# Patient Record
Sex: MALE | Race: WHITE | URBAN - METROPOLITAN AREA
[De-identification: names, ages, dates, MRNs, and addresses within clinical notes are randomized per-mention and may not be internally consistent; named-entity substitution may affect disease eponyms.]

---

## 2018-03-02 ENCOUNTER — ALLIED HEALTH/NURSE VISIT (OUTPATIENT)
Dept: SURGERY | Facility: CLINIC | Age: 68
End: 2018-03-02
Payer: COMMERCIAL

## 2018-03-02 ENCOUNTER — ANESTHESIA EVENT (OUTPATIENT)
Dept: SURGERY | Facility: CLINIC | Age: 68
End: 2018-03-02
Payer: MEDICARE

## 2018-03-02 ENCOUNTER — OFFICE VISIT (OUTPATIENT)
Dept: SURGERY | Facility: CLINIC | Age: 68
End: 2018-03-02
Payer: COMMERCIAL

## 2018-03-02 ENCOUNTER — OFFICE VISIT (OUTPATIENT)
Dept: NEUROSURGERY | Facility: CLINIC | Age: 68
End: 2018-03-02
Payer: COMMERCIAL

## 2018-03-02 VITALS
HEIGHT: 69 IN | RESPIRATION RATE: 16 BRPM | SYSTOLIC BLOOD PRESSURE: 114 MMHG | BODY MASS INDEX: 33.1 KG/M2 | DIASTOLIC BLOOD PRESSURE: 66 MMHG | HEART RATE: 94 BPM | WEIGHT: 223.5 LBS | TEMPERATURE: 97.5 F | OXYGEN SATURATION: 95 %

## 2018-03-02 VITALS
HEIGHT: 69 IN | SYSTOLIC BLOOD PRESSURE: 114 MMHG | WEIGHT: 223.8 LBS | DIASTOLIC BLOOD PRESSURE: 66 MMHG | BODY MASS INDEX: 33.15 KG/M2 | HEART RATE: 109 BPM

## 2018-03-02 DIAGNOSIS — Z98.890 S/P CRANIOTOMY: ICD-10-CM

## 2018-03-02 DIAGNOSIS — Z01.818 PREOP EXAMINATION: ICD-10-CM

## 2018-03-02 DIAGNOSIS — G50.0 TRIGEMINAL NEURALGIA OF RIGHT SIDE OF FACE: ICD-10-CM

## 2018-03-02 DIAGNOSIS — Z01.818 PREOP EXAMINATION: Primary | ICD-10-CM

## 2018-03-02 DIAGNOSIS — G50.0 TRIGEMINAL NEURALGIA: Primary | ICD-10-CM

## 2018-03-02 LAB
ANION GAP SERPL CALCULATED.3IONS-SCNC: 6 MMOL/L (ref 3–14)
BUN SERPL-MCNC: 35 MG/DL (ref 7–30)
CALCIUM SERPL-MCNC: 9.6 MG/DL (ref 8.5–10.1)
CHLORIDE SERPL-SCNC: 106 MMOL/L (ref 94–109)
CO2 SERPL-SCNC: 31 MMOL/L (ref 20–32)
CREAT SERPL-MCNC: 1.47 MG/DL (ref 0.66–1.25)
ERYTHROCYTE [DISTWIDTH] IN BLOOD BY AUTOMATED COUNT: 13.8 % (ref 10–15)
GFR SERPL CREATININE-BSD FRML MDRD: 47 ML/MIN/1.7M2
GLUCOSE SERPL-MCNC: 93 MG/DL (ref 70–99)
HCT VFR BLD AUTO: 45.4 % (ref 40–53)
HGB BLD-MCNC: 14.7 G/DL (ref 13.3–17.7)
MCH RBC QN AUTO: 30.6 PG (ref 26.5–33)
MCHC RBC AUTO-ENTMCNC: 32.4 G/DL (ref 31.5–36.5)
MCV RBC AUTO: 94 FL (ref 78–100)
PLATELET # BLD AUTO: 190 10E9/L (ref 150–450)
POTASSIUM SERPL-SCNC: 3.8 MMOL/L (ref 3.4–5.3)
RBC # BLD AUTO: 4.81 10E12/L (ref 4.4–5.9)
SODIUM SERPL-SCNC: 143 MMOL/L (ref 133–144)
WBC # BLD AUTO: 9.4 10E9/L (ref 4–11)

## 2018-03-02 ASSESSMENT — PAIN SCALES - GENERAL: PAINLEVEL: NO PAIN (0)

## 2018-03-02 ASSESSMENT — LIFESTYLE VARIABLES: TOBACCO_USE: 1

## 2018-03-02 NOTE — ANESTHESIA PREPROCEDURE EVALUATION
Anesthesia Evaluation     . Pt has had prior anesthetic. Type: General and MAC    No history of anesthetic complications          ROS/MED HX    ENT/Pulmonary:     (+)allergic rhinitis, tobacco use, Past use 0.5 packs/day  , . .    Neurologic:     (+)other neuro left hemiplegia secondary to transverse myelitis in 2005.  right trigeminal neuralgia.      Cardiovascular:     (+) Dyslipidemia, hypertension----. : . . . :. . Previous cardiac testing date:results:date: results:ECG reviewed date:2016 results:SR, left axis deviation date: results:          METS/Exercise Tolerance:  1 - Eating, dressing   Hematologic:  - neg hematologic  ROS       Musculoskeletal:   (+) , , other musculoskeletal- left hemiplegia.  low back pain.        GI/Hepatic:  - neg GI/hepatic ROS       Renal/Genitourinary:     (+) chronic renal disease, type: CRI, Nephrolithiasis , Pt does not require dialysis, Pt has no history of transplant,       Endo:     (+) Obesity, .      Psychiatric:  - neg psychiatric ROS       Infectious Disease:  - neg infectious disease ROS       Malignancy:      - no malignancy   Other:    (+) H/O Chronic Pain,H/O chronic opiod use ,                    Physical Exam  Normal systems: pulmonary    Airway   Mallampati: III  TM distance: >3 FB  Neck ROM: full    Dental   (+) missing  Comment: Multiple missing teeth    Cardiovascular   Rhythm and rate: regular and normal  (+) weak pulses       Pulmonary    breath sounds clear to auscultation    Other findings: Left hemiplegia  BLE edema L>R           PAC Discussion and Assessment    ASA Classification: 3  Case is suitable for: [place]  Anesthetic techniques and relevant risks discussed: GA  Invasive monitoring and risk discussed:   Types:   Possibility and Risk of blood transfusion discussed:   NPO instructions given:   Additional anesthetic preparation and risks discussed:   Needs early admission to pre-op area:   Other:     PAC Resident/NP Anesthesia Assessment:  Colby  Iván is a 67 year old male scheduled for a Radio Frequency Right Rhizotomy on 3/15/2018 by Dr. Conrad in treatment of trigeminal neuralgia.  PAC referral for risk assessment and optimization for anesthesia with comorbid conditions of: hypertension, hyperlipidemia, allergic rhinitis, history of smoking, obesity and left hemiplegia secondary to history of transverse myelitis.    Pre-operative considerations:  1.  Cardiac:  Functional status- METS 1-2.  He is mostly sedentary in a wheelchair, but is able to walk around his home with a walker.  He reports though that if he were to walk outside for more than 100ft he would likely get short of breath.  Exertional dyspnea likely due to his sedentary lifestyle and deconditioning. Hypertension is managed with lisinopril/hctz and amlodipine; hold lisinopril/hctz DOS.  He has significant non-pitting BLE edema, but the left is much greater than the right.  He wears compression hose and wraps.  Intermediate risk surgery with 0.4% risk of major adverse cardiac event.   2.  Pulm:  Airway feasible.  NIKKIE risk: intermediate.  Quit smoking in 2013.     3.  GI:  Risk of PONV score = 2.  If > 2, anti-emetic intervention recommended.  4. Neuromuscular:  Left hemiplegia secondary to transverse myelitis in 2005.  He reports that the transverse myelitis was caused by an infection at the time.  He uses a wheelchair, but is able to walk short distances with a walker by dragging his left leg.  He also has periodic low back pain.  Consider fall risk precautions and cautious positioning.  He denies any history of gout, but reports that he takes allopurinol due to a history of elevated uric acid.  He uses the norco only very rarely.    5. Heme:  Instructed by neurosurgery to hold his ibuprofen and aspirin for 5 days prior to surgery.  6. Renal:  CKD - creatinine today 1.47    VTE risk: 1.8%    Patient is optimized and is acceptable candidate for the proposed procedure.  No further diagnostic  evaluation is needed.     Patient discussed with Dr. Pham.     For further details of assessment, testing, and physical exam please see H and P completed on same date.          Valentine Arroyo DNP, RN, APRN      Reviewed and Signed by PAC Mid-Level Provider/Resident  Mid-Level Provider/Resident: Valentine Arroyo DNP, RN, APRN  Date: 3/2/2018  Time: 1525    Attending Anesthesiologist Anesthesia Assessment:  67 year old for RF right rhizotomy in treatment of trigeminal neuralgia. Patient is debilitated due to transverse myelitis in the past; this is low risk surgery, so no further workup indicated.    Patient/case discussed with VIVIEN. No need to see patient. Patient is appropriate for the planned procedure without further work-up or medical management.      Reviewed and Signed by PAC Anesthesiologist  Anesthesiologist: barrera  Date: 3/2/2018  Time:   Pass/Fail: Pass  Disposition:     PAC Pharmacist Assessment:        Pharmacist:   Date:   Time:      Anesthesia Plan      History & Physical Review  History and physical reviewed and following examination; no interval change.    ASA Status:  3 .        Plan for MAC and General with Intravenous induction. Maintenance will be Balanced.  Reason for MAC:  Procedure to face, neck, head or breast         Postoperative Care      Consents                          .

## 2018-03-02 NOTE — PATIENT INSTRUCTIONS
Preparing for Your Surgery      Name:  Colby Minor   MRN:  [id number]   :  1950   Today's Date:  3/2/2018     Arriving for surgery:  Surgery date:  3/15/18  Arrival time:  6 am    Please come to:   [facility] [address]  [address] [place]  [address]    -   parking is available in front of the hospital from 5:15 am to 8:00 pm    -  Stop at the Information Desk in the lobby    -   Inform the information person that you are here for surgery. An escort to 3C will be provided. If you would not like an escort, please proceed to 3C on the 3rd floor. [phone]   -  Bring your ID and insurance card.    What can I eat or drink?  -  You may have solid food or milk products until 8 hours prior to your surgery. (12 midnight )  -  You may have water, apple juice, BLACK coffee (NO creamer or nondairy creamer), or 7up/Sprite until 2 hours prior to your surgery. (6 am )    Which medicines can I take?       -  Do not bring your own medications to the hospital.          -  Hold Multivitamin and Vitamin D3 for 7 days prior to surgery.        -  Hold Aspirin and Ibuprofen 5 days prior to surgery. Last dose 18. (Per Grecia Mcrae NP 3-2-18 instructions)    -  Do NOT take these medications in the morning, the day of surgery:  Lisinopril-Hydrochlorothiazide, Miralax    -  Please take these medications the morning of surgery:  Dilantin, Claritin, Allopurinol, Baclofen, Atorvastatin (If day to take it)      Hydrocodone if needed    How do I prepare myself?  -  Take two showers: one the night before surgery; and one the morning of surgery.         Use Scrubcare or Hibiclens to wash from neck down.  You may use your own shampoo and conditioner. No other hair products.   -  Do NOT use lotion, powder, colognes, deodorant, or antiperspirant the day of your surgery.  -  Do NOT wear any jewelry.    Questions or Concerns:  If you have questions or concerns, please call  the  Preoperative Assessment Center, Monday-Friday 7AM-7PM:  [phone]    AFTER YOUR SURGERY  Breathing exercises   Breathing exercises help you recover faster. Take deep breaths and let the air out slowly. This will:     Help you wake up after surgery.    Help prevent complications like pneumonia.  Preventing complications will help you go home sooner.   Nausea and vomiting   You may feel sick to your stomach after surgery; if so, let your nurse know.    Pain control:  After surgery, you may have pain. Our goal is to help you manage your pain. Pain medicine will help you feel comfortable enough to do activities that will help you heal.  These activities may include breathing exercises, walking and physical therapy.   To help your health care team treat your pain we will ask: 1) If you have pain  2) where it is located 3) describe your pain in your words  Methods of pain control include medications given by mouth, vein or by nerve block for some surgeries.  Sequential Compression Device (SCD) or Pneumo Boots:  You may need to wear SCD S on your legs or feet. These are wraps connected to a machine that pumps in air and releases it. The repeated pumping helps prevent blood clots from forming.

## 2018-03-02 NOTE — LETTER
3/2/2018       RE: Colby Minor  [address] [place] [id number]     Dear Colleague,    Thank you for referring your patient, Colby Minor, to the [facility] NEUROSURGERY at [facility]. Please see a copy of my visit note below.    Neurosurgery Consultation    Colby Minor MRN# [id number]   YOB: 1950 Age: 67 year old   Date of Service: 03/03/18     Provider Requesting Consultation:   Colby Lindsey MD  [place] [phone]    Dear Dr. Lindsey,    I had the pleasure of seeing Mr. Minor in the office at your request in consultation regarding his recurrent right sided facial pain.    This is a 67 year old male with a PMH significant for myelitis, spinal cord injury with resulting left sided weakness, HTN, hyperlipidemia, chronic left shoulder pain, venous insufficieny in LLE, gout, seasonal allergy, and right-sided trigeminal neuralgia. He is known to Dr. Conrad of our service for treating his trigeminal neuralgia with radiofrequency rhizotomy (RF) on 7/21/015 and microvascular decompression (MVD) on 11/3/2016.   The patient presents with 3 months history of recurrent paroxysmal lancinating pain from the right periauricular area radiating into the right cheek, right mandible and occasionally in his right forehead. He denies a precipitating factor. He further denies a history of TMD/ oral/ dental disease, bruxism, shingles, lyme's disease, and head/face injuries or traumas following the MVD. The pain attacks him sporadically or when he eats, talks, swallows and moves his face. He endorses mild numbness in the inner mucosal since the RF. He otherwise denies constant background aching pain, dysesthesia, associated autonomic symptoms, or pain in the contralateral side of his face.  The symptom escalated significantly since 1/2018 for which his PCP started him on Dilantin 30 mg TID with no relief. He also had a head CT in  1/2018, which was negative for pathology per patient. The patient states that he does not wish to stay on medication for long term management given his previous complications of hyponatremia and Dilantin toxicity as well as side effects of cognitive impairment and increased weakness of left leg from baseline. Most importantly, he lives alone and can only walk for a short distance with a walker and hence safety is a big concern. He is interested in surgical intervention for his recurrent trigeminal neuralgia pain.            Past Medical History:     Past Medical History:   Diagnosis Date     Allergic rhinitis      CKD (chronic kidney disease)      Gout      Hyperlipidemia      Hypertension      Left hemiplegia (H)      Nephrolithiasis      Transverse myelitis (H) 2005     Trigeminal neuralgia      Venous (peripheral) insufficiency     LLE              Past Surgical History:     Past Surgical History:   Procedure Laterality Date     COLONOSCOPY       CRANIOTOMY, DECOMPRESS NEUROVASCULAR, COMBINED Right 11/3/2016    Procedure: COMBINED CRANIOTOMY, DECOMPRESS NEUROVASCULAR;  Surgeon: Richard Conrad MD;  Location: [facility] OR     ENT SURGERY      tonsillectomy     GI SURGERY      hemorrhoidectomy     IR RHIZOTOMY  2015     right hip replacement                Social History:     Social History     Social History     Marital status:      Spouse name: N/A     Number of children: 4     Years of education: N/A     Occupational History     retired      Social History Main Topics     Smoking status: Former Smoker     Packs/day: 0.50     Years: 10.00     Types: Cigarettes     Start date: 1/1/1993     Quit date: 10/11/2013     Smokeless tobacco: Never Used     Alcohol use No     Drug use: No     Sexual activity: Not on file     Other Topics Concern     Not on file     Social History Narrative             Family History:     Family History   Problem Relation Age of Onset     Lung Cancer Mother      CEREBROVASCULAR  DISEASE Father      Unknown/Adopted Sister              Immunizations:   There is no immunization history for the selected administration types on file for this patient.          Allergies:     No Active Allergies           Medications:     Current Outpatient Prescriptions:      phenytoin (DILANTIN) 30 MG CR capsule, Take 2 capsules (60 mg) by mouth 3 times daily, Disp: 90 capsule, Rfl: 0     aspirin 325 MG tablet, Take 1 tablet (325 mg) by mouth every morning, Disp: 120 tablet, Rfl: 0     ibuprofen 200 MG capsule, Take 200 mg by mouth 3 times daily, Disp: 120 capsule, Rfl: 0     Incontinence Supplies (LEG ) MISC, 1 Units continuous prn, Disp: 1 each, Rfl: 0     AMLODIPINE BESYLATE PO, Take by mouth every evening , Disp: , Rfl:      POTASSIUM CITRATE PO, Take by mouth daily (with dinner) , Disp: , Rfl:      lidocaine (LMX4) 4 % CREA 4% topical cream, Apply topically once as needed for mild pain, Disp: , Rfl:      lisinopril-hydrochlorothiazide (PRINZIDE,ZESTORETIC) 20-25 MG per tablet, Take 1 tablet by mouth every morning , Disp: , Rfl:      loratadine (CLARITIN) 10 MG tablet, Take 10 mg by mouth every morning , Disp: , Rfl:      Multiple Vitamins-Minerals (MULTIVITAMIN PO), Take 1 tablet by mouth every morning , Disp: , Rfl:      ALLOPURINOL PO, Take 300 mg by mouth every morning , Disp: , Rfl:      ATORVASTATIN CALCIUM PO, Take 10 mg by mouth every other day At  Bedtime, Disp: , Rfl:      Cholecalciferol (VITAMIN D3 PO), Take 2,000 Units by mouth every morning , Disp: , Rfl:      BACLOFEN PO, Take 40 mg by mouth 3 times daily , Disp: , Rfl:      HYDROCODONE-ACETAMINOPHEN PO, Take by mouth as needed, Disp: , Rfl:      Polyethylene Glycol 3350 (MIRALAX PO), Take by mouth every morning, Disp: , Rfl:      Pseudoephedrine HCl (NASAL DECONGESTANT PO), Take by mouth as needed, Disp: , Rfl:             Review of Systems:     ROS: His 10 point review of system is as stated above in HPI, PMH, and PSH. The  "remaining system is otherwise negative.               Physical Exam:   /66 (BP Location: Left arm, Patient Position: Sitting, Cuff Size: Adult Large)  Pulse 109  Ht 1.75 m (5' 8.9\")  Wt 101.5 kg (223 lb 12.8 oz)  BMI 33.15 kg/m2    General: Well-nourished and well-developed in no acute distress.  HEENT: Head is normocephalic and atraumatic. Neck is supple without adenopathy or thyromegaly. Palpation of his bilateral temporomandibular joint shows no popping, clicking, tenderness and/or swelling. Conjunctivae are anicteric. Oropharynx and nasopharynx are without exudate and/or erythema. Inspection of his oral cavity shows no obvious ulcerations and/or lesions.   Lungs: Clear to auscultation.   Cardiovascular: Heart rate is regular with S1, S2. Peripheral pulses are +2 throughout. He has no JVD. Positive edema in LLE with compression stocking on         Focused Neurologic Exam:   He is alert, oriented and cooperative. He answers questions and follows commands appropriately in fluent speech and normal tone. Memory and fund of knowledge are normal. Bilateral pupils are round, equal and reactive to light and accommodation. Extraocular movements are intact with no nystagmus and/or disconjugation. Visual fields to direct confrontation are full. Corneal reflexes are positive bilaterally. Masseter and temporalis muscle strength is normal. Facial sensation and expression are symmetric. Hearing is to  baseline bilaterally. Gag reflex is present. Tongue and uvula are midline with normal palate movements and no fasciculation. Trapezius and sternocleidal strength is equivocal. He has no finger-to-nose dysmetria. Finger-nose-finger, heel-to-shin rub and rapid alternating finger and hand taps are normal.   Strength in upper and RLE extremities is 5/5 and 4/5 in LLE. Deep tendon reflexes are symmetric. Sensation to light touch is intact in 4 extremities. He has difficulty to arise from the chair and ambulate due to edema " and weakness in the LLE without his walker. Romberg test and Tandem walk are deferred.          Data:   No images and labs for review.           Assessment and Recommendation:   Mr. Minor presents with 3 months history of recurrent idiopathic right-sided trigeminal neuralgia in V2 and V3 distributions.     Base on his clinical history, life style, and living situation, I told the patient that it is reasonable to consider surgery. I would recommend repeat radiofrequency rhizotomy or balloon compression in his case.    The patient was informed that radiofrequency rhizotomy offers a slightly better outcome (average 4-5 years) compared to the balloon compression and glycerol rhizolysis (average 1.5-2 years). The initial success rate of radiofrequency is about 80% procedure ranges between 60%-80% with a medium term recurrence rate of 2-3 years.  The procedure can be repeated for future recurrences upon reevaluation.  Potential complications associated with percutaneous procedures include but are not limited to dysesthesia (rarely anesthesia dolorosa), bleeding, meningitis, alternation in salivation, temporary ocular paresis, partial masseter weakness, corneal anesthesia, neuroparalytic keratitis, alternation in lacrimation and possibly failure to respond.    He also asked a number of good questions, which I answered to the best of my knowledge today. He said he is already familiar with the radiofrequency rhizotomy and would like to move forward with this. I will have our office schedule the surgery and preop H&P in PAC accordingly. Due to the distance, the patient is okay to final review the procedure with Dr. Conrad over the phone or on the day of procedure.     Lastly, I suggest increasing the Dilantin dosage to 60 mg TID to bridge the pain between now and the surgery. He is agreeable to this.    Thank you very much for allowing us to participate in the care of this patient. Please do not hesitate to contact us with  questions. We will keep you informed of his progress.     > 70% of the 60 minutes visit today I spent counseling and educating the patient on the surgical and non-surgical options for recurrent trigeminal neuralgia.      Again, thank you for allowing me to participate in the care of your patient.      Sincerely,    NIHARIKA Turner CNP

## 2018-03-02 NOTE — H&P
Pre-Operative H & P     CC:  Preoperative exam to assess for increased cardiopulmonary risk while undergoing surgery and anesthesia.    Date of Encounter: 3/2/2018  Primary Care Physician:  Colby Lindsey  Colby Minor is a 67 year old male who presents for pre-operative H & P in preparation for a Radio Frequency Right Rhizotomy with Dr. Conrad on 3/15/2018 at [facility].     Colby Minor is a 67 year old male with hypertension, hyperlipidemia, allergic rhinitis, history of smoking, obesity and left hemiplegia secondary to history of transverse myelitis that has chronic right sided trigeminal neuralgia.  He had a rhizotomy procedure in 2015, but that only seemed to help the symptoms for about 2 months.  Then in 2016 a decompression procedure was done to try to treat the trigeminal neuralgia since the rhizotomy didn't lead to long term relief.  The symptoms only improved after that for about 3 months.  Then the trigeminal neuralgia pain seemed to worsen and become much more frequent to an almost constant state around the end of November 2017.  He has been on dilantin for management, but that hasn't helped much.  He has norco for prn use, but he says he rarely takes that as it doesn't seem to help much either.  He followed up in the neurosurgery clinic today due to the severity of his trigeminal neuralgia.  The dilantin was increased and another rhizotomy procedure was recommended.      History is obtained from the patient.     Past Medical History  Past Medical History:   Diagnosis Date     Allergic rhinitis      Gout      Hyperlipidemia      Hypertension      Left hemiplegia (H)      Nephrolithiasis      Transverse myelitis (H) 2005     Trigeminal neuralgia        Past Surgical History  Past Surgical History:   Procedure Laterality Date     COLONOSCOPY       CRANIOTOMY, DECOMPRESS NEUROVASCULAR, COMBINED Right 11/3/2016    Procedure: COMBINED CRANIOTOMY,  DECOMPRESS NEUROVASCULAR;  Surgeon: Richard Conrad MD;  Location: [facility] OR     ENT SURGERY      tonsillectomy     GI SURGERY      hemorrhoidectomy     IR RHIZOTOMY  2015     right hip replacement         Hx of Blood transfusions/reactions: none     Hx of abnormal bleeding or anti-platelet use: aspirin      Steroid use in the last year: none    Personal or FH with difficulty with Anesthesia:  none    Prior to Admission Medications  Current Outpatient Prescriptions   Medication Sig Dispense Refill     phenytoin (DILANTIN) 30 MG CR capsule Take 2 capsules (60 mg) by mouth 3 times daily 90 capsule 0     HYDROCODONE-ACETAMINOPHEN PO Take by mouth as needed       Polyethylene Glycol 3350 (MIRALAX PO) Take by mouth every morning       Pseudoephedrine HCl (NASAL DECONGESTANT PO) Take by mouth as needed       aspirin 325 MG tablet Take 1 tablet (325 mg) by mouth every morning 120 tablet 0     ibuprofen 200 MG capsule Take 200 mg by mouth 3 times daily 120 capsule 0     AMLODIPINE BESYLATE PO Take by mouth every evening        POTASSIUM CITRATE PO Take by mouth daily (with dinner)        lidocaine (LMX4) 4 % CREA 4% topical cream Apply topically once as needed for mild pain       lisinopril-hydrochlorothiazide (PRINZIDE,ZESTORETIC) 20-25 MG per tablet Take 1 tablet by mouth every morning        loratadine (CLARITIN) 10 MG tablet Take 10 mg by mouth every morning        Multiple Vitamins-Minerals (MULTIVITAMIN PO) Take 1 tablet by mouth every morning        ALLOPURINOL PO Take 300 mg by mouth every morning        ATORVASTATIN CALCIUM PO Take 10 mg by mouth every other day        Cholecalciferol (VITAMIN D3 PO) Take 2,000 Units by mouth every morning        BACLOFEN PO Take 40 mg by mouth 3 times daily        Incontinence Supplies (LEG ) MISC 1 Units continuous prn 1 each 0       Allergies  Allergies   Allergen Reactions     Seasonal Allergies      Other reaction(s): Runny Nose       Social History  Social  "History     Social History     Marital status:      Spouse name: N/A     Number of children: 4     Years of education: N/A     Occupational History     retired      Social History Main Topics     Smoking status: Former Smoker     Packs/day: 0.50     Years: 10.00     Types: Cigarettes     Start date: 1/1/1993     Quit date: 10/11/2013     Smokeless tobacco: Never Used     Alcohol use No     Drug use: No     Sexual activity: Not on file     Other Topics Concern     Not on file     Social History Narrative       Family History  Family History   Problem Relation Age of Onset     Lung Cancer Mother      CEREBROVASCULAR DISEASE Father      Unknown/Adopted Sister            ROS/MED HX  The complete review of systems is negative other than noted in the HPI or here.   ENT/Pulmonary:     (+)allergic rhinitis, tobacco use, Past use 0.5 packs/day  , . .    Neurologic:     (+)other neuro left hemiplegia secondary to transverse myelitis in 2005.  right trigeminal neuralgia.      Cardiovascular:     (+) Dyslipidemia, hypertension----. : . . . :. . Previous cardiac testing date:results:date: results:ECG reviewed date:2016 results:SR, left axis deviation date: results:          METS/Exercise Tolerance:  1 - Eating, dressing   Hematologic:  - neg hematologic  ROS       Musculoskeletal:   (+) , , other musculoskeletal- left hemiplegia.  low back pain.        GI/Hepatic:  - neg GI/hepatic ROS       Renal/Genitourinary:     (+) Nephrolithiasis ,  CKD     Endo:     (+) Obesity, .      Psychiatric:  - neg psychiatric ROS       Infectious Disease:  - neg infectious disease ROS       Malignancy:      - no malignancy   Other:    (+) H/O Chronic Pain,H/O chronic opiod use ,            Temp: 97.5  F (36.4  C) Temp src: Oral BP: 114/66 Pulse: 94   Resp: 16 SpO2: 95 %         223 lbs 8 oz  5' 8.9\"   Body mass index is 33.1 kg/(m^2).       Physical Exam  Constitutional: Awake, alert, cooperative, no apparent distress, and appears older " than stated age. obese  Eyes: Pupils equal, round and reactive to light, extra ocular muscles intact, sclera clear, conjunctiva normal.  HENT: Normocephalic, oral pharynx with moist mucus membranes.  Dentition - multiple missing teeth.  No goiter appreciated.   Respiratory: Clear to auscultation bilaterally, no crackles or wheezing.  Cardiovascular: Regular rate and rhythm, normal S1 and S2, and no murmur noted.  Carotids +2, no bruits. BLE non-pitting edema; L>R. Palpable radial pulses.  Bilateral pedal pulses diminished.    GI: Normal bowel sounds, soft, non-distended, non-tender, no masses palpated, no hepatosplenomegaly.  Large ventral hernia.    Lymph/Hematologic: No cervical lymphadenopathy and no supraclavicular lymphadenopathy.  Genitourinary:  deferred  Skin: Warm and dry.  No rashes at anticipated surgical site.   Musculoskeletal: Limited ROM of neck. There is no redness, warmth, or swelling of the exposed joints. Gross motor strength is normal on the right, but diminished on the left.   Neurologic: Awake, alert, oriented to name, place and time. Cranial nerves II-XII are grossly intact. Gait is impaired.    Neuropsychiatric: Calm, cooperative. Normal affect.     Labs: (personally reviewed)   Component      Latest Ref Rng & Units 3/2/2018   Sodium      133 - 144 mmol/L 143   Potassium      3.4 - 5.3 mmol/L 3.8   Chloride      94 - 109 mmol/L 106   Carbon Dioxide      20 - 32 mmol/L 31   Anion Gap      3 - 14 mmol/L 6   Glucose      70 - 99 mg/dL 93   Urea Nitrogen      7 - 30 mg/dL 35 (H)   Creatinine      0.66 - 1.25 mg/dL 1.47 (H)   GFR Estimate      >60 mL/min/1.7m2 47 (L)   GFR Estimate If Black      >60 mL/min/1.7m2 57 (L)   Calcium      8.5 - 10.1 mg/dL 9.6   WBC      4.0 - 11.0 10e9/L 9.4   RBC Count      4.4 - 5.9 10e12/L 4.81   Hemoglobin      13.3 - 17.7 g/dL 14.7   Hematocrit      40.0 - 53.0 % 45.4   MCV      78 - 100 fl 94   MCH      26.5 - 33.0 pg 30.6   MCHC      31.5 - 36.5 g/dL 32.4    RDW      10.0 - 15.0 % 13.8   Platelet Count      150 - 450 10e9/L 190       EK  Sinus rhythm, left axis deviation      ASSESSMENT and PLAN  Colby Minor is a 67 year old male scheduled for a Radio Frequency Right Rhizotomy on 3/15/2018 by Dr. Conrad in treatment of trigeminal neuralgia.  PAC referral for risk assessment and optimization for anesthesia with comorbid conditions of: hypertension, hyperlipidemia, allergic rhinitis, history of smoking, obesity and left hemiplegia secondary to history of transverse myelitis.    Pre-operative considerations:  1.  Cardiac:  Functional status- METS 1-2.  He is mostly sedentary in a wheelchair, but is able to walk around his home with a walker.  He reports though that if he were to walk outside for more than 100ft he would likely get short of breath.  Exertional dyspnea likely due to his sedentary lifestyle and deconditioning. Hypertension is managed with lisinopril/hctz and amlodipine; hold lisinopril/hctz DOS.  He has significant non-pitting BLE edema, but the left is much greater than the right.  He wears compression hose and wraps.  Intermediate risk surgery with 0.4% risk of major adverse cardiac event.   2.  Pulm:  Airway feasible.  NIKKIE risk: intermediate.  Quit smoking in .     3.  GI:  Risk of PONV score = 2.  If > 2, anti-emetic intervention recommended.  4. Neuromuscular:  Left hemiplegia secondary to transverse myelitis in .  He reports that the transverse myelitis was caused by an infection at the time.  He uses a wheelchair, but is able to walk short distances with a walker by dragging his left leg.  He also has periodic low back pain.  Consider fall risk precautions and cautious positioning.  He denies any history of gout, but reports that he takes allopurinol due to a history of elevated uric acid.  He uses the norco only very rarely.    5. Heme:  Instructed by neurosurgery to hold his ibuprofen and aspirin for 5 days prior to surgery.  6.  Renal:  CKD - creatinine today 1.47    VTE risk: 1.8%    Patient is optimized and is acceptable candidate for the proposed procedure.  No further diagnostic evaluation is needed.     Patient discussed with Dr. Pham.               Valentine Arroyo DNP, RN, APRN  Preoperative Assessment Center   [facility]  Clinic and Surgery Center  Phone: [phone]  Fax: [phone]

## 2018-03-02 NOTE — MR AVS SNAPSHOT
After Visit Summary   3/2/2018    Colby Minor    MRN: [id number]           Patient Information     Date Of Birth          1950        Visit Information        Provider Department      3/2/2018 10:00 AM Grecia Mcrae APRN [facility] Neurosurgery        Today's Diagnoses     Trigeminal neuralgia    -  1    S/P craniotomy          Care Instructions    1. Increase Dilantin to 60 mg 3 times per day.  2. Will schedule radiofrequency rhizotomy and PAC visit for preop H&P.  3. We will help you to wean off Dilantin after the surgery. Do not do this on your own.  4. Call [phone] for concerns and questions.          Pre-op Teaching    Procedure:Radiofrequecy Percutaneous Right Trigeminal Rhizotomy  Planned Surgery Date:3/19/18  Surgeon:[place]                Discussed pre-op routine and requirements to include:  surgical procedure, post-op recovery and expectations, need for H&P, NPO prior to OR, pre-op antibacterial showers, pain control and importance of follow-up visits.  Surgery scheduling will coordinate OR time/date and update patient as appropriate.  3C will call to re-inforce instructions 24-48 hours prior to surgery.       Ample time was provided for patient questions and in-depth discussion of topics of heightened interest.  Reviewed medication list and provided instructions regarding what medications to stop prior to surgery: ASA and Ibuprofen 5 days prior to procedure.    Anti-bacterial soap solution for pre-op showers will be provided at PAC visit as well as specific instructions for use. Approximately 20 minutes spent with patient/family discussing and reviewing.      Patient provided triage contact number for questions or concerns that may arise prior to surgery. Pre-op folder with specific written instructions given to patient for review.            Follow-ups after your visit        Your next 10 appointments already scheduled     Mar 02, 2018  3:00 PM CST   (Arrive by  2:45 PM)   PAC EVALUATION with Blade Pac Shannon 8   [facility] ([facility] [place])    [address]  [address] [facility] [address]   [phone]            Mar 02, 2018  4:00 PM CST   (Arrive by 3:45 PM)   PAC RN ASSESSMENT with Blade Pac Rn   [facility] ([facility])    [address]  [address] [facility] [address]   [phone]            Mar 02, 2018  4:30 PM CST   (Arrive by 4:15 PM)   PAC Anesthesia Consult with Blade Pac Anesthesiologist   [facility] [place] ([facility])    [address]  [address] [facility] [address]   [phone]            Mar 19, 2018   Procedure with GENERIC ANESTHESIA PROVIDER   [facility]    [address] [address]   [phone]            Mar 19, 2018  8:00 AM CDT   (Arrive by 7:45 AM)   IR RHIZOTOMY with UUIR3   [facility] ([facility] [place]    [address] [address]   [phone]           1. You will need to have had a history and physical exam within 7 days of the procedure. 2. Laboratory test are to be obtained by your doctor prior to the exam (CBCP, INR and PTT) 3. Someone will need to drive you to and from the hospital. 4. If you are or may be pregnant, contact your doctor or a Radiology nurse prior to the day of the exam. 5. If you have diabetes, check with your doctor or a Radiology nurse to see if your insulin needs to be adjusted for the exam. 6. If you are taking Coumadin (to thin you blood) please contact your doctor or a Radiology nurse at least 3 days before the exam for special instructions. 7. The day before your exam you may eat your regular diet and are encouraged to drink at least 2 quarts of clear liquids. Drink no alcoholic beverages for 24 hours  "prior to the exam. 8. Do not eat any solid food or milk products for 6 hours prior to the exam. You may drink clear liquids until 2 hours prior to the exam. Clear liquids include the following: water, Jell-O, clear broth, apple juice or any noncarbonated drink that you can see through (no pop!) 9. The morning of the exam you may brush your teeth and take medications as directed with a sip of water. 10. Tell the Radiology nurse if you have any allergies.              Future tests that were ordered for you today     Open Future Orders        Priority Expected Expires Ordered    IR Rhizotomy Routine  3/2/2019 3/2/2018    CT Head w/o contrast Routine  3/2/2019 3/2/2018            Who to contact     Please call your clinic at [phone] to:    Ask questions about your health    Make or cancel appointments    Discuss your medicines    Learn about your test results    Speak to your doctor            Additional Information About Your Visit        [employer] Information     [employer] is an electronic gateway that provides easy, online access to your medical records. With [employer], you can request a clinic appointment, read your test results, renew a prescription or communicate with your care team.     To sign up for [employer] visit the website at [email or web address]   You will be asked to enter the access code listed below, as well as some personal information. Please follow the directions to create your username and password.     Your access code is: [email or web address]  Expires: 2018  6:30 AM     Your access code will  in 90 days. If you need help or a new code, please contact your [facility] or call [phone] for assistance.        Care EveryWhere ID     This is your Care EveryWhere ID. This could be used by other organizations to access your [facility] medical records  [id number]        Your Vitals Were     Pulse Height BMI (Body Mass Index)             109 1.75 m (5' 8.9\") 33.15 " kg/m2          Blood Pressure from Last 3 Encounters:   03/02/18 114/66   11/06/16 125/61   10/11/16 126/78    Weight from Last 3 Encounters:   03/02/18 101.5 kg (223 lb 12.8 oz)   11/04/16 100.3 kg (221 lb 1.9 oz)   10/11/16 102.1 kg (225 lb)              We Performed the Following     Katya-Operative Worksheet          Today's Medication Changes          These changes are accurate as of 3/2/18 11:22 AM.  If you have any questions, ask your nurse or doctor.               Start taking these medicines.        Dose/Directions    phenytoin 30 MG CR capsule   Commonly known as:  DILANTIN   Used for:  Trigeminal neuralgia, S/P craniotomy   Started by:  Grecia Mcrae APRN CNP        Dose:  60 mg   Take 2 capsules (60 mg) by mouth 3 times daily   Quantity:  90 capsule   Refills:  0            Where to get your medicines      [facility] [id number]     Phone:  [phone]     phenytoin 30 MG CR capsule                Primary Care Provider Office Phone # Fax #    Colby Lindsey [phone]       [facility] [address] [place], [place] [id number]        Equal Access to Services     AIDEN IBRAHIM AH: Hadii shanon ku hadasho Soomaali, waaxda luqadaha, qaybta kaalmada adeegyada, waxangel taverain hayaan geeta plunkett. So [facility] [phone].    ATENCIÓN: Si habla español, tiene a mcbride disposición servicios gratuitos de asistencia lingüística. Llame al [phone].    We comply with applicable federal civil rights laws and [place] laws. We do not discriminate on the basis of race, color, national origin, age, disability, sex, sexual orientation, or gender identity.            Thank you!     Thank you for choosing [facility] NEUROSURGERY  for your care. Our goal is always to provide you with excellent care. Hearing back from our patients is one way we can continue to improve our services.  Please take a few minutes to complete the written survey that you may receive in the mail after your visit with us. Thank you!             Your Updated Medication List - Protect others around you: Learn how to safely use, store and throw away your medicines at www.disposemymeds.org.          This list is accurate as of 3/2/18 11:22 AM.  Always use your most recent med list.                   Brand Name Dispense Instructions for use Diagnosis    ALLOPURINOL PO      Take 300 mg by mouth every morning        AMLODIPINE BESYLATE PO      Take by mouth every evening        aspirin 325 MG tablet     120 tablet    Take 1 tablet (325 mg) by mouth every morning        ATORVASTATIN CALCIUM PO      Take 10 mg by mouth At Bedtime        BACLOFEN PO      Take 10 mg by mouth 2 times daily        ibuprofen 200 MG capsule     120 capsule    Take 200 mg by mouth 3 times daily        Leg  Misc     1 each    1 Units continuous prn    Trigeminal neuralgia       lidocaine 4 % Crea cream    LMX4     Apply topically once as needed for mild pain        lisinopril-hydrochlorothiazide 20-25 MG per tablet    PRINZIDE/ZESTORETIC     Take 1 tablet by mouth every morning        loratadine 10 MG tablet    CLARITIN     Take 10 mg by mouth every morning        MULTIVITAMIN PO      Take 1 tablet by mouth every morning        oxyCODONE IR 5 MG tablet    ROXICODONE    24 tablet    Take 1-2 tablets (5-10 mg) by mouth every 4 hours as needed for moderate to severe pain    Trigeminal neuralgia       phenytoin 30 MG CR capsule    DILANTIN    90 capsule    Take 2 capsules (60 mg) by mouth 3 times daily    Trigeminal neuralgia, S/P craniotomy       POTASSIUM CITRATE PO      Take by mouth At Bedtime        senna-docusate 8.6-50 MG per tablet    SENOKOT-S;PERICOLACE    20 tablet    Take 1-2 tablets by mouth 2 times daily    Trigeminal neuralgia       VITAMIN D3 PO      Take 2,000 Units by mouth every morning

## 2018-03-02 NOTE — PROGRESS NOTES
All labs are okay for surgery.  Kidney function is chronically impaired, but is stable compared to the last labs here in 2016.        Valentine Arroyo DNP, RN, ANP-C

## 2018-03-02 NOTE — NURSING NOTE
Chief Complaint   Patient presents with     Consult     [facility] NEW - FACIAL PAIN     Hansa Looney MA

## 2018-03-02 NOTE — MR AVS SNAPSHOT
After Visit Summary   3/2/2018    Colby Minor    MRN: [id number]           Patient Information     Date Of Birth          1950        Visit Information        Provider Department      3/2/2018 4:00 PM Rn, [facility]        Care Instructions    Preparing for Your Surgery      Name:  Colby Minor   MRN:  [id number]   :  1950   Today's Date:  3/2/2018     Arriving for surgery:  Surgery date:  3/15/18  Arrival time:  6 am    Please come to:   [facility] [address]  [address] [place]  [address]    -   parking is available in front of the hospital from 5:15 am to 8:00 pm    -  Stop at the Information Desk in the lobby    -   Inform the information person that you are here for surgery. An escort to 3C will be provided. If you would not like an escort, please proceed to 3C on the 3rd floor. [phone]   -  Bring your ID and insurance card.    What can I eat or drink?  -  You may have solid food or milk products until 8 hours prior to your surgery. (12 midnight )  -  You may have water, apple juice, BLACK coffee (NO creamer or nondairy creamer), or 7up/Sprite until 2 hours prior to your surgery. (6 am )    Which medicines can I take?       -  Do not bring your own medications to the hospital.          -  Hold Multivitamin and Vitamin D3 for 7 days prior to surgery.        -  Hold Aspirin and Ibuprofen 5 days prior to surgery. Last dose 18. (Per Grecia Mcrae NP 3-2-18 instructions)    -  Do NOT take these medications in the morning, the day of surgery:  Lisinopril-Hydrochlorothiazide, Miralax    -  Please take these medications the morning of surgery:  Dilantin, Claritin, Allopurinol, Baclofen, Atorvastatin (If day to take it)      Hydrocodone if needed    How do I prepare myself?  -  Take two showers: one the night before surgery; and one the morning of surgery.         Use Scrubcare or Hibiclens  to wash from neck down.  You may use your own shampoo and conditioner. No other hair products.   -  Do NOT use lotion, powder, colognes, deodorant, or antiperspirant the day of your surgery.  -  Do NOT wear any jewelry.    Questions or Concerns:  If you have questions or concerns, please call the  Preoperative Assessment Center, Monday-Friday 7AM-7PM:  [phone]    AFTER YOUR SURGERY  Breathing exercises   Breathing exercises help you recover faster. Take deep breaths and let the air out slowly. This will:     Help you wake up after surgery.    Help prevent complications like pneumonia.  Preventing complications will help you go home sooner.   Nausea and vomiting   You may feel sick to your stomach after surgery; if so, let your nurse know.    Pain control:  After surgery, you may have pain. Our goal is to help you manage your pain. Pain medicine will help you feel comfortable enough to do activities that will help you heal.  These activities may include breathing exercises, walking and physical therapy.   To help your health care team treat your pain we will ask: 1) If you have pain  2) where it is located 3) describe your pain in your words  Methods of pain control include medications given by mouth, vein or by nerve block for some surgeries.  Sequential Compression Device (SCD) or Pneumo Boots:  You may need to wear SCD S on your legs or feet. These are wraps connected to a machine that pumps in air and releases it. The repeated pumping helps prevent blood clots from forming.                     Follow-ups after your visit        Your next 10 appointments already scheduled     Mar 02, 2018  4:00 PM CST   (Arrive by 3:45 PM)   PAC RN ASSESSMENT with Blade Pac Rn   [facility] ([facility])    [address] [place] [id number] [phone]            Mar 02, 2018  4:30 PM CST   (Arrive by 4:15 PM)   PAC Anesthesia Consult with Uc Pac  Anesthesiologist   [facility] ([facility])    [address]  4th Floor  [facility] [id number] [phone]            Mar 02, 2018  4:45 PM CST   LAB with  LAB   [facility] Lab ([facility])    [address]  1st Floor  [facility] [id number] [phone]           Please do not eat 10-12 hours before your appointment if you are coming in fasting for labs on lipids, cholesterol, or glucose (sugar). This does not apply to pregnant women. Water, hot tea and black coffee (with nothing added) are okay. Do not drink other fluids, diet soda or chew gum.            Mar 15, 2018  8:00 AM CDT   (Arrive by 7:45 AM)   IR RHIZOTOMY with UUIR3   [facility], [place], Interventional Radiology ([facility])    [address] [phone]           1. You will need to have had a history and physical exam within 7 days of the procedure. 2. Laboratory test are to be obtained by your doctor prior to the exam (CBCP, INR and PTT) 3. Someone will need to drive you to and from the hospital. 4. If you are or may be pregnant, contact your doctor or a Radiology nurse prior to the day of the exam. 5. If you have diabetes, check with your doctor or a Radiology nurse to see if your insulin needs to be adjusted for the exam. 6. If you are taking Coumadin (to thin you blood) please contact your doctor or a Radiology nurse at least 3 days before the exam for special instructions. 7. The day before your exam you may eat your regular diet and are encouraged to drink at least 2 quarts of clear liquids. Drink no alcoholic beverages for 24 hours prior to the exam. 8. Do not eat any solid food or milk products for 6 hours prior to the exam. You may drink clear liquids until 2 hours prior to the exam. Clear liquids include the following: water, Jell-O, clear broth, apple  juice or any noncarbonated drink that you can see through (no pop!) 9. The morning of the exam you may brush your teeth and take medications as directed with a sip of water. 10. Tell the Radiology nurse if you have any allergies.            Mar 15, 2018   Procedure with GENERIC ANESTHESIA PROVIDER   [facility]    [address] [facility] [id number] [phone]              Future tests that were ordered for you today     Open Future Orders        Priority Expected Expires Ordered    CBC with platelets Routine 3/2/2018 2018 3/2/2018    Basic metabolic panel Routine 3/2/2018 2018 3/2/2018    IR Rhizotomy Routine  3/2/2019 3/2/2018    CT Head w/o contrast Routine  3/2/2019 3/2/2018            Who to contact     Please call your clinic at [phone] to:    Ask questions about your health    Make or cancel appointments    Discuss your medicines    Learn about your test results    Speak to your doctor            Additional Information About Your Visit        [employer] Information     [employer] is an electronic gateway that provides easy, online access to your medical records. With [employer], you can request a clinic appointment, read your test results, renew a prescription or communicate with your care team.     To sign up for [employer] visit the website at [email or web address]   You will be asked to enter the access code listed below, as well as some personal information. Please follow the directions to create your username and password.     Your access code is: [email or web address]  Expires: 2018  6:30 AM     Your access code will  in 90 days. If you need help or a new code, please contact your [facility] or call [phone] for assistance.        Care EveryWhere ID     This is your Care EveryWhere ID. This could be used by other organizations to access your [facility] medical records  [id number]         Blood Pressure from Last 3 Encounters:    03/02/18 114/66   03/02/18 114/66   11/06/16 125/61    Weight from Last 3 Encounters:   03/02/18 101.4 kg (223 lb 8 oz)   03/02/18 101.5 kg (223 lb 12.8 oz)   11/04/16 100.3 kg (221 lb 1.9 oz)              Today, you had the following     No orders found for display         Today's Medication Changes          These changes are accurate as of 3/2/18  3:15 PM.  If you have any questions, ask your nurse or doctor.               Start taking these medicines.        Dose/Directions    phenytoin 30 MG CR capsule   Commonly known as:  DILANTIN   Used for:  Trigeminal neuralgia, S/P craniotomy   Started by:  Grecia Mcrae APRN CNP        Dose:  60 mg   Take 2 capsules (60 mg) by mouth 3 times daily   Quantity:  90 capsule   Refills:  0            Where to get your medicines      These medications were sent to [place] [id number]     Phone:  [phone]     phenytoin 30 MG CR capsule                Primary Care Provider Office Phone # Fax #    Colby Lindsey [phone]       [facility] [address] [place], [place] [id number]        Equal Access to Services     AIDEN IBRAHIM AH: Hadii shanon ku hadasho Soomaali, waaxda luqadaha, qaybta kaalmada adeegyada, waxay idiin haylizethn geeta plunkett. So [facility] [phone].    ATENCIÓN: Si habla español, tiene a mcbride disposición servicios gratuitos de asistencia lingüística. Llame al [phone].    We comply with applicable federal civil rights laws and [place] laws. We do not discriminate on the basis of race, color, national origin, age, disability, sex, sexual orientation, or gender identity.            Thank you!     Thank you for choosing [facility]OPER[facility]  for your care. Our goal is always to provide you with excellent care. Hearing back from our patients is one way we can continue to improve our services. Please take a few minutes to  complete the written survey that you may receive in the mail after your visit with us. Thank you!             Your Updated Medication List - Protect others around you: Learn how to safely use, store and throw away your medicines at www.disposemymeds.org.          This list is accurate as of 3/2/18  3:15 PM.  Always use your most recent med list.                   Brand Name Dispense Instructions for use Diagnosis    ALLOPURINOL PO      Take 300 mg by mouth every morning        AMLODIPINE BESYLATE PO      Take by mouth every evening        aspirin 325 MG tablet     120 tablet    Take 1 tablet (325 mg) by mouth every morning        ATORVASTATIN CALCIUM PO      Take 10 mg by mouth every other day        BACLOFEN PO      Take 40 mg by mouth 3 times daily        HYDROCODONE-ACETAMINOPHEN PO      Take by mouth as needed        ibuprofen 200 MG capsule     120 capsule    Take 200 mg by mouth 3 times daily        Leg  Misc     1 each    1 Units continuous prn    Trigeminal neuralgia       lidocaine 4 % Crea cream    LMX4     Apply topically once as needed for mild pain        lisinopril-hydrochlorothiazide 20-25 MG per tablet    PRINZIDE/ZESTORETIC     Take 1 tablet by mouth every morning        loratadine 10 MG tablet    CLARITIN     Take 10 mg by mouth every morning        MIRALAX PO      Take by mouth every morning        MULTIVITAMIN PO      Take 1 tablet by mouth every morning        NASAL DECONGESTANT PO      Take by mouth as needed        phenytoin 30 MG CR capsule    DILANTIN    90 capsule    Take 2 capsules (60 mg) by mouth 3 times daily    Trigeminal neuralgia, S/P craniotomy       POTASSIUM CITRATE PO      Take by mouth daily (with dinner)        VITAMIN D3 PO      Take 2,000 Units by mouth every morning

## 2018-03-02 NOTE — PATIENT INSTRUCTIONS
1. Increase Dilantin to 60 mg 3 times per day.  2. Will schedule radiofrequency rhizotomy and PAC visit for preop H&P.  3. We will help you to wean off Dilantin after the surgery. Do not do this on your own.  4. Call [phone] for concerns and questions.

## 2018-03-03 NOTE — PROGRESS NOTES
Neurosurgery Consultation    Colby Minor MRN# [id number]   YOB: 1950 Age: 67 year old   Date of Service: 03/03/18     Provider Requesting Consultation:   Colby Lindsey MD  [place] [id number]    Dear Dr. Lindsey,    I had the pleasure of seeing Mr. Minor in the office at your request in consultation regarding his recurrent right sided facial pain.    This is a 67 year old male with a PMH significant for myelitis, spinal cord injury with resulting left sided weakness, HTN, hyperlipidemia, chronic left shoulder pain, venous insufficieny in LLE, gout, seasonal allergy, and right-sided trigeminal neuralgia. He is known to Dr. Conrad of our service for treating his trigeminal neuralgia with radiofrequency rhizotomy (RF) on 7/21/015 and microvascular decompression (MVD) on 11/3/2016.   The patient presents with 3 months history of recurrent paroxysmal lancinating pain from the right periauricular area radiating into the right cheek, right mandible and occasionally in his right forehead. He denies a precipitating factor. He further denies a history of TMD/ oral/ dental disease, bruxism, shingles, lyme's disease, and head/face injuries or traumas following the MVD. The pain attacks him sporadically or when he eats, talks, swallows and moves his face. He endorses mild numbness in the inner mucosal since the RF. He otherwise denies constant background aching pain, dysesthesia, associated autonomic symptoms, or pain in the contralateral side of his face.  The symptom escalated significantly since 1/2018 for which his PCP started him on Dilantin 30 mg TID with no relief. He also had a head CT in 1/2018, which was negative for pathology per patient. The patient states that he does not wish to stay on medication for long term management given his previous complications of hyponatremia and Dilantin toxicity as well as side effects of cognitive impairment and increased  weakness of left leg from baseline. Most importantly, he lives alone and can only walk for a short distance with a walker and hence safety is a big concern. He is interested in surgical intervention for his recurrent trigeminal neuralgia pain.            Past Medical History:     Past Medical History:   Diagnosis Date     Allergic rhinitis      CKD (chronic kidney disease)      Gout      Hyperlipidemia      Hypertension      Left hemiplegia (H)      Nephrolithiasis      Transverse myelitis (H) 2005     Trigeminal neuralgia      Venous (peripheral) insufficiency     LLE              Past Surgical History:     Past Surgical History:   Procedure Laterality Date     COLONOSCOPY       CRANIOTOMY, DECOMPRESS NEUROVASCULAR, COMBINED Right 11/3/2016    Procedure: COMBINED CRANIOTOMY, DECOMPRESS NEUROVASCULAR;  Surgeon: Richard Conrad MD;  Location: [facility] OR     ENT SURGERY      tonsillectomy     GI SURGERY      hemorrhoidectomy     IR RHIZOTOMY  2015     right hip replacement                Social History:     Social History     Social History     Marital status:      Spouse name: N/A     Number of children: 4     Years of education: N/A     Occupational History     retired      Social History Main Topics     Smoking status: Former Smoker     Packs/day: 0.50     Years: 10.00     Types: Cigarettes     Start date: 1/1/1993     Quit date: 10/11/2013     Smokeless tobacco: Never Used     Alcohol use No     Drug use: No     Sexual activity: Not on file     Other Topics Concern     Not on file     Social History Narrative             Family History:     Family History   Problem Relation Age of Onset     Lung Cancer Mother      CEREBROVASCULAR DISEASE Father      Unknown/Adopted Sister              Immunizations:   There is no immunization history for the selected administration types on file for this patient.          Allergies:     No Active Allergies           Medications:     Current Outpatient Prescriptions:       "phenytoin (DILANTIN) 30 MG CR capsule, Take 2 capsules (60 mg) by mouth 3 times daily, Disp: 90 capsule, Rfl: 0     aspirin 325 MG tablet, Take 1 tablet (325 mg) by mouth every morning, Disp: 120 tablet, Rfl: 0     ibuprofen 200 MG capsule, Take 200 mg by mouth 3 times daily, Disp: 120 capsule, Rfl: 0     Incontinence Supplies (LEG ) MISC, 1 Units continuous prn, Disp: 1 each, Rfl: 0     AMLODIPINE BESYLATE PO, Take by mouth every evening , Disp: , Rfl:      POTASSIUM CITRATE PO, Take by mouth daily (with dinner) , Disp: , Rfl:      lidocaine (LMX4) 4 % CREA 4% topical cream, Apply topically once as needed for mild pain, Disp: , Rfl:      lisinopril-hydrochlorothiazide (PRINZIDE,ZESTORETIC) 20-25 MG per tablet, Take 1 tablet by mouth every morning , Disp: , Rfl:      loratadine (CLARITIN) 10 MG tablet, Take 10 mg by mouth every morning , Disp: , Rfl:      Multiple Vitamins-Minerals (MULTIVITAMIN PO), Take 1 tablet by mouth every morning , Disp: , Rfl:      ALLOPURINOL PO, Take 300 mg by mouth every morning , Disp: , Rfl:      ATORVASTATIN CALCIUM PO, Take 10 mg by mouth every other day At  Bedtime, Disp: , Rfl:      Cholecalciferol (VITAMIN D3 PO), Take 2,000 Units by mouth every morning , Disp: , Rfl:      BACLOFEN PO, Take 40 mg by mouth 3 times daily , Disp: , Rfl:      HYDROCODONE-ACETAMINOPHEN PO, Take by mouth as needed, Disp: , Rfl:      Polyethylene Glycol 3350 (MIRALAX PO), Take by mouth every morning, Disp: , Rfl:      Pseudoephedrine HCl (NASAL DECONGESTANT PO), Take by mouth as needed, Disp: , Rfl:             Review of Systems:     ROS: His 10 point review of system is as stated above in HPI, PMH, and PSH. The remaining system is otherwise negative.               Physical Exam:   /66 (BP Location: Left arm, Patient Position: Sitting, Cuff Size: Adult Large)  Pulse 109  Ht 1.75 m (5' 8.9\")  Wt 101.5 kg (223 lb 12.8 oz)  BMI 33.15 kg/m2    General: Well-nourished and well-developed in " no acute distress.  HEENT: Head is normocephalic and atraumatic. Neck is supple without adenopathy or thyromegaly. Palpation of his bilateral temporomandibular joint shows no popping, clicking, tenderness and/or swelling. Conjunctivae are anicteric. Oropharynx and nasopharynx are without exudate and/or erythema. Inspection of his oral cavity shows no obvious ulcerations and/or lesions.   Lungs: Clear to auscultation.   Cardiovascular: Heart rate is regular with S1, S2. Peripheral pulses are +2 throughout. He has no JVD. Positive edema in LLE with compression stocking on         Focused Neurologic Exam:   He is alert, oriented and cooperative. He answers questions and follows commands appropriately in fluent speech and normal tone. Memory and fund of knowledge are normal. Bilateral pupils are round, equal and reactive to light and accommodation. Extraocular movements are intact with no nystagmus and/or disconjugation. Visual fields to direct confrontation are full. Corneal reflexes are positive bilaterally. Masseter and temporalis muscle strength is normal. Facial sensation and expression are symmetric. Hearing is to  baseline bilaterally. Gag reflex is present. Tongue and uvula are midline with normal palate movements and no fasciculation. Trapezius and sternocleidal strength is equivocal. He has no finger-to-nose dysmetria. Finger-nose-finger, heel-to-shin rub and rapid alternating finger and hand taps are normal.   Strength in upper and RLE extremities is 5/5 and 4/5 in LLE. Deep tendon reflexes are symmetric. Sensation to light touch is intact in 4 extremities. He has difficulty to arise from the chair and ambulate due to edema and weakness in the LLE without his walker. Romberg test and Tandem walk are deferred.          Data:   No images and labs for review.           Assessment and Recommendation:   Mr. Minor presents with 3 months history of recurrent idiopathic right-sided trigeminal neuralgia in V2 and  V3 distributions.     Base on his clinical history, life style, and living situation, I told the patient that it is reasonable to consider surgery. I would recommend repeat radiofrequency rhizotomy or balloon compression in his case.    The patient was informed that radiofrequency rhizotomy offers a slightly better outcome (average 4-5 years) compared to the balloon compression and glycerol rhizolysis (average 1.5-2 years). The initial success rate of radiofrequency is about 80% procedure ranges between 60%-80% with a medium term recurrence rate of 2-3 years.  The procedure can be repeated for future recurrences upon reevaluation.  Potential complications associated with percutaneous procedures include but are not limited to dysesthesia (rarely anesthesia dolorosa), bleeding, meningitis, alternation in salivation, temporary ocular paresis, partial masseter weakness, corneal anesthesia, neuroparalytic keratitis, alternation in lacrimation and possibly failure to respond.    He also asked a number of good questions, which I answered to the best of my knowledge today. He said he is already familiar with the radiofrequency rhizotomy and would like to move forward with this. I will have our office schedule the surgery and preop H&P in PAC accordingly. Due to the distance, the patient is okay to final review the procedure with Dr. Conrad over the phone or on the day of procedure.     Lastly, I suggest increasing the Dilantin dosage to 60 mg TID to bridge the pain between now and the surgery. He is agreeable to this.    Thank you very much for allowing us to participate in the care of this patient. Please do not hesitate to contact us with questions. We will keep you informed of his progress.     > 70% of the 60 minutes visit today I spent counseling and educating the patient on the surgical and non-surgical options for recurrent trigeminal neuralgia.      Grecia Mcrae, CIELO, APRN, FNP-BC  [facility]  [place]   Department of Neurosurgery  Phone: [phone]  Fax: [phone]

## 2018-03-05 NOTE — NURSING NOTE
Pre-op Teaching    Procedure:Radiofrequency Percutaneous Right Trigeminal Rhizotomy  Planned Surgery Date:3/15/18  Surgeon:[place]                Discussed pre-op routine and requirements to include:  surgical procedure, post-op recovery and expectations, need for H&P, NPO prior to OR, pre-op antibacterial showers, pain control and importance of follow-up visits.  Surgery scheduling will coordinate OR time/date and update patient as appropriate.  3C will call to re-inforce instructions 24-48 hours prior to surgery.       Ample time was provided for patient questions and in-depth discussion of topics of heightened interest.  Reviewed medication list and provided instructions regarding what medications to stop prior to surgery. Anti-bacterial soap solution for pre-op showers will be provided at PAC visit as well as specific instructions for use. Approximately 20 minutes spent with patient/family discussing and reviewing.      Patient provided triage contact number for questions or concerns that may arise prior to surgery. Pre-op folder with specific written instructions given to patient for review.

## 2018-03-15 ENCOUNTER — HOSPITAL ENCOUNTER (OUTPATIENT)
Facility: CLINIC | Age: 68
Discharge: HOME | End: 2018-03-15
Attending: NEUROLOGICAL SURGERY | Admitting: NEUROLOGICAL SURGERY
Payer: MEDICARE

## 2018-03-15 ENCOUNTER — APPOINTMENT (OUTPATIENT)
Dept: INTERVENTIONAL RADIOLOGY/VASCULAR | Facility: CLINIC | Age: 68
End: 2018-03-15
Attending: NEUROLOGICAL SURGERY
Payer: MEDICARE

## 2018-03-15 ENCOUNTER — ANESTHESIA (OUTPATIENT)
Dept: SURGERY | Facility: CLINIC | Age: 68
End: 2018-03-15
Payer: MEDICARE

## 2018-03-15 ENCOUNTER — SURGERY (OUTPATIENT)
Age: 68
End: 2018-03-15

## 2018-03-15 VITALS
OXYGEN SATURATION: 97 % | HEIGHT: 69 IN | BODY MASS INDEX: 33.14 KG/M2 | SYSTOLIC BLOOD PRESSURE: 154 MMHG | TEMPERATURE: 98.4 F | DIASTOLIC BLOOD PRESSURE: 81 MMHG | RESPIRATION RATE: 16 BRPM | WEIGHT: 223.77 LBS

## 2018-03-15 DIAGNOSIS — Z98.890 S/P CRANIOTOMY: ICD-10-CM

## 2018-03-15 DIAGNOSIS — G50.0 TRIGEMINAL NEURALGIA: ICD-10-CM

## 2018-03-15 LAB — GLUCOSE BLDC GLUCOMTR-MCNC: 111 MG/DL (ref 70–99)

## 2018-03-15 PROCEDURE — 40000170 ZZH STATISTIC PRE-PROCEDURE ASSESSMENT II

## 2018-03-15 PROCEDURE — 82962 GLUCOSE BLOOD TEST: CPT

## 2018-03-15 PROCEDURE — 25000128 H RX IP 250 OP 636: Performed by: STUDENT IN AN ORGANIZED HEALTH CARE EDUCATION/TRAINING PROGRAM

## 2018-03-15 PROCEDURE — 27210903 ZZH KIT CR5

## 2018-03-15 PROCEDURE — 71000014 ZZH RECOVERY PHASE 1 LEVEL 2 FIRST HR

## 2018-03-15 PROCEDURE — 37000008 ZZH ANESTHESIA TECHNICAL FEE, 1ST 30 MIN

## 2018-03-15 PROCEDURE — 37000009 ZZH ANESTHESIA TECHNICAL FEE, EACH ADDTL 15 MIN

## 2018-03-15 PROCEDURE — 25000125 ZZHC RX 250: Performed by: NURSE ANESTHETIST, CERTIFIED REGISTERED

## 2018-03-15 PROCEDURE — 77002 NEEDLE LOCALIZATION BY XRAY: CPT

## 2018-03-15 PROCEDURE — 71000027 ZZH RECOVERY PHASE 2 EACH 15 MINS

## 2018-03-15 PROCEDURE — 25000128 H RX IP 250 OP 636: Performed by: NURSE ANESTHETIST, CERTIFIED REGISTERED

## 2018-03-15 PROCEDURE — 25000128 H RX IP 250 OP 636: Performed by: ANESTHESIOLOGY

## 2018-03-15 RX ORDER — SODIUM CHLORIDE, SODIUM LACTATE, POTASSIUM CHLORIDE, CALCIUM CHLORIDE 600; 310; 30; 20 MG/100ML; MG/100ML; MG/100ML; MG/100ML
INJECTION, SOLUTION INTRAVENOUS CONTINUOUS
Status: DISCONTINUED | OUTPATIENT
Start: 2018-03-15 | End: 2018-03-15 | Stop reason: HOSPADM

## 2018-03-15 RX ORDER — SODIUM CHLORIDE, SODIUM LACTATE, POTASSIUM CHLORIDE, CALCIUM CHLORIDE 600; 310; 30; 20 MG/100ML; MG/100ML; MG/100ML; MG/100ML
INJECTION, SOLUTION INTRAVENOUS CONTINUOUS PRN
Status: DISCONTINUED | OUTPATIENT
Start: 2018-03-15 | End: 2018-03-15

## 2018-03-15 RX ORDER — ESMOLOL HYDROCHLORIDE 10 MG/ML
INJECTION INTRAVENOUS PRN
Status: DISCONTINUED | OUTPATIENT
Start: 2018-03-15 | End: 2018-03-15

## 2018-03-15 RX ORDER — FENTANYL CITRATE 50 UG/ML
25-50 INJECTION, SOLUTION INTRAMUSCULAR; INTRAVENOUS
Status: DISCONTINUED | OUTPATIENT
Start: 2018-03-15 | End: 2018-03-15 | Stop reason: HOSPADM

## 2018-03-15 RX ORDER — ONDANSETRON 4 MG/1
4 TABLET, ORALLY DISINTEGRATING ORAL EVERY 30 MIN PRN
Status: DISCONTINUED | OUTPATIENT
Start: 2018-03-15 | End: 2018-03-15 | Stop reason: HOSPADM

## 2018-03-15 RX ORDER — CEFAZOLIN SODIUM 2 G/100ML
2 INJECTION, SOLUTION INTRAVENOUS
Status: COMPLETED | OUTPATIENT
Start: 2018-03-15 | End: 2018-03-15

## 2018-03-15 RX ORDER — MEPERIDINE HYDROCHLORIDE 50 MG/ML
12.5 INJECTION INTRAMUSCULAR; INTRAVENOUS; SUBCUTANEOUS
Status: DISCONTINUED | OUTPATIENT
Start: 2018-03-15 | End: 2018-03-15 | Stop reason: HOSPADM

## 2018-03-15 RX ORDER — METOPROLOL TARTRATE 1 MG/ML
INJECTION, SOLUTION INTRAVENOUS PRN
Status: DISCONTINUED | OUTPATIENT
Start: 2018-03-15 | End: 2018-03-15

## 2018-03-15 RX ORDER — NALOXONE HYDROCHLORIDE 0.4 MG/ML
.1-.4 INJECTION, SOLUTION INTRAMUSCULAR; INTRAVENOUS; SUBCUTANEOUS
Status: DISCONTINUED | OUTPATIENT
Start: 2018-03-15 | End: 2018-03-15 | Stop reason: HOSPADM

## 2018-03-15 RX ORDER — ONDANSETRON 2 MG/ML
4 INJECTION INTRAMUSCULAR; INTRAVENOUS EVERY 30 MIN PRN
Status: DISCONTINUED | OUTPATIENT
Start: 2018-03-15 | End: 2018-03-15 | Stop reason: HOSPADM

## 2018-03-15 RX ORDER — CEFAZOLIN SODIUM 1 G/3ML
1 INJECTION, POWDER, FOR SOLUTION INTRAMUSCULAR; INTRAVENOUS SEE ADMIN INSTRUCTIONS
Status: DISCONTINUED | OUTPATIENT
Start: 2018-03-15 | End: 2018-03-15 | Stop reason: HOSPADM

## 2018-03-15 RX ADMIN — ESMOLOL HYDROCHLORIDE 20 MG: 10 INJECTION, SOLUTION INTRAVENOUS at 08:17

## 2018-03-15 RX ADMIN — SODIUM CHLORIDE, POTASSIUM CHLORIDE, SODIUM LACTATE AND CALCIUM CHLORIDE: 600; 310; 30; 20 INJECTION, SOLUTION INTRAVENOUS at 07:43

## 2018-03-15 RX ADMIN — CEFAZOLIN SODIUM 2 G: 2 INJECTION, SOLUTION INTRAVENOUS at 08:00

## 2018-03-15 RX ADMIN — METHOHEXITAL SODIUM 40 MG: 500 INJECTION, POWDER, LYOPHILIZED, FOR SOLUTION INTRAMUSCULAR; INTRAVENOUS; RECTAL at 08:49

## 2018-03-15 RX ADMIN — METHOHEXITAL SODIUM 50 MG: 500 INJECTION, POWDER, LYOPHILIZED, FOR SOLUTION INTRAMUSCULAR; INTRAVENOUS; RECTAL at 08:43

## 2018-03-15 RX ADMIN — METHOHEXITAL SODIUM 40 MG: 500 INJECTION, POWDER, LYOPHILIZED, FOR SOLUTION INTRAMUSCULAR; INTRAVENOUS; RECTAL at 08:21

## 2018-03-15 RX ADMIN — METHOHEXITAL SODIUM 10 MG: 500 INJECTION, POWDER, LYOPHILIZED, FOR SOLUTION INTRAMUSCULAR; INTRAVENOUS; RECTAL at 08:25

## 2018-03-15 RX ADMIN — METHOHEXITAL SODIUM 50 MG: 500 INJECTION, POWDER, LYOPHILIZED, FOR SOLUTION INTRAMUSCULAR; INTRAVENOUS; RECTAL at 08:30

## 2018-03-15 RX ADMIN — FENTANYL CITRATE 25 MCG: 50 INJECTION, SOLUTION INTRAMUSCULAR; INTRAVENOUS at 09:20

## 2018-03-15 RX ADMIN — METHOHEXITAL SODIUM 40 MG: 500 INJECTION, POWDER, LYOPHILIZED, FOR SOLUTION INTRAMUSCULAR; INTRAVENOUS; RECTAL at 08:36

## 2018-03-15 RX ADMIN — ESMOLOL HYDROCHLORIDE 20 MG: 10 INJECTION, SOLUTION INTRAVENOUS at 08:46

## 2018-03-15 RX ADMIN — METHOHEXITAL SODIUM 50 MG: 500 INJECTION, POWDER, LYOPHILIZED, FOR SOLUTION INTRAMUSCULAR; INTRAVENOUS; RECTAL at 08:24

## 2018-03-15 RX ADMIN — ESMOLOL HYDROCHLORIDE 10 MG: 10 INJECTION, SOLUTION INTRAVENOUS at 08:11

## 2018-03-15 RX ADMIN — METOPROLOL TARTRATE 2 MG: 5 INJECTION INTRAVENOUS at 08:34

## 2018-03-15 RX ADMIN — ESMOLOL HYDROCHLORIDE 20 MG: 10 INJECTION, SOLUTION INTRAVENOUS at 08:25

## 2018-03-15 NOTE — ANESTHESIA CARE TRANSFER NOTE
Patient: Colby Minor    Procedure(s):  Anesthesia Coverage Radio Frequency Right Rhizotomy @0800    Diagnosis: Trigeminal Neuralgia   Diagnosis Additional Information: No value filed.    Anesthesia Type:   No value filed.     Note:  Airway :Nasal Cannula  Patient transferred to:PACU  Handoff Report: Identifed the Patient, Identified the Reponsible Provider, Reviewed the pertinent medical history, Discussed the surgical course, Reviewed Intra-OP anesthesia mangement and issues during anesthesia, Set expectations for post-procedure period and Allowed opportunity for questions and acknowledgement of understanding      Vitals: (Last set prior to Anesthesia Care Transfer)    CRNA VITALS  3/15/2018 0833 - 3/15/2018 0904      3/15/2018             NIBP: 143/72    Pulse: 97    SpO2: 100 %    Resp Rate (observed): 16    EKG: NSR                Electronically Signed By: NIHARIKA Dougherty CRNA  March 15, 2018  9:04 AM

## 2018-03-15 NOTE — BRIEF OP NOTE
[facility] [place]    Brief Operative Note    Pre-operative diagnosis: Trigeminal Neuralgia   Post-operative diagnosis Trigeminal Neuralgia   Procedure: Procedure(s):  Anesthesia Coverage Radio Frequency Right Rhizotomy @0800  Surgeon: Surgeon(s) and Role:     * GENERIC ANESTHESIA PROVIDER - Primary     * Richard Conrad MD - Assisting  Anesthesia: General   Estimated blood loss: 1cc  Drains: None  Specimens: None  Findings:  Stimulation of the right V2 and V3 segments prior to treatment  Complications: None.  Implants: None.

## 2018-03-15 NOTE — ANESTHESIA POSTPROCEDURE EVALUATION
Patient: Colby Minor    Procedure(s):  Anesthesia Coverage Radio Frequency Right Rhizotomy @0800    Diagnosis:Trigeminal Neuralgia   Diagnosis Additional Information: No value filed.    Anesthesia Type:  No value filed.    Note:  Anesthesia Post Evaluation    Patient location during evaluation: PACU  Patient participation: Able to fully participate in evaluation  Level of consciousness: awake and alert  Pain management: adequate  Airway patency: patent  Cardiovascular status: acceptable and hemodynamically stable  Respiratory status: acceptable  Hydration status: acceptable  PONV: none     Anesthetic complications: None          Last vitals:  Vitals:    03/15/18 0634   BP: 129/67   Resp: 20   Temp: 36.5  C (97.7  F)   SpO2: 96%         Electronically Signed By: Filiberto Rogers MD  March 15, 2018  9:15 AM

## 2018-03-15 NOTE — ANESTHESIA CARE TRANSFER NOTE
Patient: Colby Minor    Procedure(s):  Anesthesia Coverage Radio Frequency Right Rhizotomy @0800    Diagnosis: Trigeminal Neuralgia   Diagnosis Additional Information: No value filed.    Anesthesia Type:   No value filed.     Note:  Airway :Face Mask  Patient transferred to:PACU  Handoff Report: Identifed the Patient, Identified the Reponsible Provider, Reviewed the pertinent medical history, Discussed the surgical course, Reviewed Intra-OP anesthesia mangement and issues during anesthesia, Set expectations for post-procedure period and Allowed opportunity for questions and acknowledgement of understanding      Vitals: (Last set prior to Anesthesia Care Transfer)    CRNA VITALS  3/15/2018 0833 - 3/15/2018 0904      3/15/2018             NIBP: 143/72    Pulse: 97    SpO2: 100 %    Resp Rate (observed): 16    EKG: NSR                Electronically Signed By: NIHARIKA Dougherty CRNA  March 15, 2018  9:04 AM

## 2018-03-15 NOTE — OP NOTE
Procedure Date: 03/15/2018      NAME OF PROCEDURE:  Right radiofrequency trigeminal rhizotomy.      PREOPERATIVE DIAGNOSIS:  V2/V3 trigeminal neuralgia.      POSTOPERATIVE DIAGNOSIS:  V2/V3 trigeminal neuralgia.     SURGEON: Richard Conrad MD    RESIDENT SURGEON: Len Pradhan MD    ANESTHESIA:  MAC.      ESTIMATED BLOOD LOSS:  1 mL      COMPLICATIONS:  None.      INDICATIONS FOR OPERATION:  Mr. Minor is a 67-year-old male with past medical history significant for myelitis, spinal cord injury with resulting left-sided weakness, hypertension, hyperlipidemia, chronic left shoulder pain, venous insufficiency in left lower leg, gout, seasonal allergy, and right-sided trigeminal neuralgia.  The patient has had a history of right-sided trigeminal neuralgia for which he has undergone a right radiofrequency rhizotomy on 07/21/2015, as well as a right microvascular decompression on 11/03/2016.  The patient presented to clinic on 03/02/2018 with a complaint of return of his right-sided trigeminal neuralgia that was no longer adequately managed with his medications.  For this reason, he was offered a redo right radiofrequency rhizotomy.  The patient was willing to go forward with the offered intervention.      DESCRIPTION OF PROCEDURE:  The patient was marked and consented in the preoperative area.  He was then transferred down to the interventionalist suite where he was moved onto the operative table.  All pressure points were padded, and the patient's arms were tucked.  His head was extended as far as anatomically possible and a piece of waterproof tape was placed across his forehead.  Hartel landmarks were marked on his face.  The fluoroscopy arms were moved down and were aligned to his auditory canals.  The clival line was clearly visible.  MAC anesthesia was then induced using Brevital.  The skin incision was made and the cannula was directed down towards the foramen ovale using intraoperative image guidance.  Once the  foramen had been entered, no CSF return had been noted.  The curved electrode was placed through the cannula.  The curved electrode was pointed superomedially.  The patient was allowed to awaken from sedation. Using stimulation the patient stated that he was noting stimulation in the right V2 distribution.  This is an area where he does complain of trigeminal pains.  The patient was then sedated again using Brevital. A lesion was created at 65 degrees for 90 seconds.  We then repositioned the needle, having drawn it back 2-3 mm and angling the curved electrode inferolaterally.  The patient was allowed to awaken from sedation.  Stimulation in this location demonstrated right V2 and V3 sensation.  Again, this is a location that the patient has complained of trigeminal pain.  The patient was then again sedated using Brevital and a second lesion was created at 75 degrees for 90 seconds.  Sedation was lightened and pin prick discrimination demonstrated that the patient had slightly reduced sensation along the right V2 and V3 distributions as intended.  The patient was then again sedated using Brevital and the electrode and cannula were removed.  The patient was allowed to awaken from sedation and was transferred back to his hospital [facility] and taken to the postoperative area for further postoperative cares.        Dr. Best was present and scrubbed throughout the entire duration of the procedure.         MIMI BEST MD       As dictated by ARLET LEBRON MD            D: 03/15/2018   T: 03/15/2018   MT: ANDREY      Name:     LOUISE VILLARREAL   MRN:      [id number]        Account:        [id number]   :      1950           Procedure Date: 03/15/2018      Document: [id number]

## 2018-03-15 NOTE — DISCHARGE INSTRUCTIONS
[facility]  Same-Day Surgery   Adult Discharge Orders & Instructions     For 24 hours after surgery    1. Get plenty of rest.  A responsible adult must stay with you for at least 24 hours after you leave the hospital.   2. Do not drive or use heavy equipment.  If you have weakness or tingling, don't drive or use heavy equipment until this feeling goes away.  3. Do not drink alcohol.  4. Avoid strenuous or risky activities.  Ask for help when climbing stairs.   5. You may feel lightheaded.  IF so, sit for a few minutes before standing.  Have someone help you get up.   6. If you have nausea (feel sick to your stomach): Drink only clear liquids such as apple juice, ginger ale, broth or 7-Up.  Rest may also help.  Be sure to drink enough fluids.  Move to a regular diet as you feel able.  7. You may have a slight fever. Call the doctor if your fever is over 100 F (37.7 C) (taken under the tongue) or lasts longer than 24 hours.  8. You may have a dry mouth, a sore throat, muscle aches or trouble sleeping.  These should go away after 24 hours.  9. Do not make important or legal decisions.   Call your doctor for any of the followin.  Signs of infection (fever, growing tenderness at the surgery site, a large amount of drainage or bleeding, severe pain, foul-smelling drainage, redness, swelling).    2. It has been over 8 to 10 hours since surgery and you are still not able to urinate (pass water).    3.  Headache for over 24 hours.    To contact a doctor, call Dr. Conrad's neurosurgery office at [phone] or:        [phone] and ask for the resident on call for Neurosurgery (answered 24 hours a day)      Emergency Department:    [facility]: [phone]       (TTY for hearing impaired: [phone])

## 2018-03-15 NOTE — IP AVS SNAPSHOT
MRN:[id number]                      After Visit Summary   3/15/2018    Colby Minor    MRN: [id number]           Thank you!     Thank you for choosing [facility] for your care. Our goal is always to provide you with excellent care. Hearing back from our patients is one way we can continue to improve our services. Please take a few minutes to complete the written survey that you may receive in the mail after you visit with us. Thank you!        Patient Information     Date Of Birth          1950        About your hospital stay     You were admitted on:  March 15, 2018 You last received care in the:  Same Day Surgery [facility]    You were discharged on:  March 15, 2018        Reason for your hospital stay       Right trigeminal RF rhizotomy                  Who to Call     For medical emergencies, please call 911.  For non-urgent questions about your medical care, please call your primary care provider or clinic, [phone]  For questions related to your surgery, please call your surgery clinic        Attending Provider     Provider Richard Antoine MD Neurosurgery       Primary Care Provider Office Phone # Fax #    Colby Lindsey [phone]       When to contact your care team       Please call or go to the closest Emergency Room if you have increased pain, redness, drainage, or swelling at your incision, temperature > 101.5 degrees Farenheit, changes in neurologic status (such as headache, lightheadedness, dizziness, confusion, or numbness, tingling, or weakness in your face, arms, or legs) or if you have any other questions or concerns.    You may reach the Neurosurgery clinic at [phone] during regular business hours. You can call the hospital  at [phone] and ask for the on-call Neurosurgery Resident at all other times.                  After Care Instructions     Activity       Do not do any bending, twisting, strenuous  exercise, or heavy lifting (greater than 10 pounds) for 1-2 weeks. Avoid any activities that could result in trauma to the surgical wound.            Diet       Follow this diet upon discharge: Advance to a regular diet as tolerated            Discharge Instructions       - You can restart your aspirin POD#5 (3/20/18)  - Continue to take your trigeminal neuralgia medications as you were preoperatively. You will work with Grecia Mcrae in clinic to taper off of these medications.  - Take Tylenol as needed per the directions on the packaging for postoperative pain.            Wound care and dressings       You should then keep the wound undressed and open to air. You are allowed to take showers and get the wound wet starting on post-operative day #3 (3/18/18) but you may not scrub or soak the wound or keep it submerged under water. If you do happen to get the wound wet, be sure to pat dry it rather than scrubbing it with a towel.                  Follow-up Appointments     Adult [facility] Follow-up and recommended labs and tests       You should follow up with Grecia Mcrae in Neurosurgery clinic in 2 weeks for wound check and general post-operative care. You should call [phone] or [phone] if you have not heard from the hospital within 2 days of discharge regarding your follow-up appointment.                  Further instructions from your care team       [facility] [place]  Same-Day Surgery   Adult Discharge Orders & Instructions     For 24 hours after surgery    1. Get plenty of rest.  A responsible adult must stay with you for at least 24 hours after you leave the hospital.   2. Do not drive or use heavy equipment.  If you have weakness or tingling, don't drive or use heavy equipment until this feeling goes away.  3. Do not drink alcohol.  4. Avoid strenuous or risky activities.  Ask for help when climbing stairs.   5. You may feel lightheaded.  IF so, sit for a few  "minutes before standing.  Have someone help you get up.   6. If you have nausea (feel sick to your stomach): Drink only clear liquids such as apple juice, ginger ale, broth or 7-Up.  Rest may also help.  Be sure to drink enough fluids.  Move to a regular diet as you feel able.  7. You may have a slight fever. Call the doctor if your fever is over 100 F (37.7 C) (taken under the tongue) or lasts longer than 24 hours.  8. You may have a dry mouth, a sore throat, muscle aches or trouble sleeping.  These should go away after 24 hours.  9. Do not make important or legal decisions.   Call your doctor for any of the followin.  Signs of infection (fever, growing tenderness at the surgery site, a large amount of drainage or bleeding, severe pain, foul-smelling drainage, redness, swelling).    2. It has been over 8 to 10 hours since surgery and you are still not able to urinate (pass water).    3.  Headache for over 24 hours.    To contact a doctor, call Dr. Conrad's neurosurgery office at [phone] or:        [phone] and ask for the resident on call for Neurosurgery (answered 24 hours a day)      Emergency Department:    [facility]: [phone]       (TTY for hearing impaired: [phone])          Pending Results     No orders found from 3/13/2018 to 3/16/2018.            Admission Information     Date & Time Provider Department Dept. Phone    3/15/2018 Richard Conrad MD Same Day Surgery [facility] [phone]      Your Vitals Were     Blood Pressure Temperature Respirations Height Weight Pulse Oximetry    132/62 98  F (36.7  C) (Oral) 18 1.75 m (5' 8.9\") 101.5 kg (223 lb 12.3 oz) 96%    BMI (Body Mass Index)                   33.14 kg/m2           [employer] Information     [employer] lets you send messages to your doctor, view your test results, renew your prescriptions, schedule appointments and more. To sign up, go to [email or web address] . Click on \"Log in\" on the left side of the " "screen, which will take you to the Welcome page. Then click on \"Sign up Now\" on the right side of the page.     You will be asked to enter the access code listed below, as well as some personal information. Please follow the directions to create your username and password.     Your access code is: [email or web address]  Expires: 2018  7:30 AM     Your access code will  in 90 days. If you need help or a new code, please call your [place] clinic or [phone].        Care EveryWhere ID     This is your Care EveryWhere ID. This could be used by other organizations to access your [place] medical records  [id number]        Equal Access to Services     AIDEN IBRAHIM : Julio César Knox, florida ying, keily patrick, luis plunkett. So [facility] [phone].    ATENCIÓN: Si habla español, tiene a mcbride disposición servicios gratuitos de asistencia lingüística. Llame al [phone].    We comply with applicable federal civil rights laws and [place] laws. We do not discriminate on the basis of race, color, national origin, age, disability, sex, sexual orientation, or gender identity.               Review of your medicines      CONTINUE these medicines which have NOT CHANGED        Dose / Directions    ALLOPURINOL PO        Dose:  300 mg   Take 300 mg by mouth every morning   Refills:  0       AMLODIPINE BESYLATE PO        Take by mouth every evening   Refills:  0       aspirin 325 MG tablet        Dose:  325 mg   Take 1 tablet (325 mg) by mouth every morning   Quantity:  120 tablet   Refills:  0       ATORVASTATIN CALCIUM PO        Dose:  10 mg   Take 10 mg by mouth every other day At  Bedtime   Refills:  0       BACLOFEN PO        Dose:  40 mg   Take 40 mg by mouth 3 times daily   Refills:  0       HYDROCODONE-ACETAMINOPHEN PO        Take by mouth as needed   Refills:  0       ibuprofen 200 MG capsule        Dose:  200 mg   Take 200 mg by mouth 3 times daily   Quantity: "  120 capsule   Refills:  0       Leg  Misc   Used for:  Trigeminal neuralgia        Dose:  1 Units   1 Units continuous prn   Quantity:  1 each   Refills:  0       lidocaine 4 % Crea cream   Commonly known as:  LMX4        Apply topically once as needed for mild pain   Refills:  0       lisinopril-hydrochlorothiazide 20-25 MG per tablet   Commonly known as:  PRINZIDE/ZESTORETIC        Dose:  1 tablet   Take 1 tablet by mouth every morning   Refills:  0       loratadine 10 MG tablet   Commonly known as:  CLARITIN        Dose:  10 mg   Take 10 mg by mouth every morning   Refills:  0       MIRALAX PO        Take by mouth every morning   Refills:  0       MULTIVITAMIN PO        Dose:  1 tablet   Take 1 tablet by mouth every morning   Refills:  0       NASAL DECONGESTANT PO        Take by mouth as needed   Refills:  0       phenytoin 30 MG CR capsule   Commonly known as:  DILANTIN   Used for:  Trigeminal neuralgia, S/P craniotomy        Dose:  60 mg   Take 2 capsules (60 mg) by mouth 3 times daily   Quantity:  90 capsule   Refills:  0       POTASSIUM CITRATE PO        Take by mouth daily (with dinner)   Refills:  0       VITAMIN D3 PO        Dose:  2000 Units   Take 2,000 Units by mouth every morning   Refills:  0                Protect others around you: Learn how to safely use, store and throw away your medicines at www.disposemymeds.org.             Medication List: This is a list of all your medications and when to take them. Check marks below indicate your daily home schedule. Keep this list as a reference.      Medications           Morning Afternoon Evening Bedtime As Needed    ALLOPURINOL PO   Take 300 mg by mouth every morning                                AMLODIPINE BESYLATE PO   Take by mouth every evening                                aspirin 325 MG tablet   Take 1 tablet (325 mg) by mouth every morning                                ATORVASTATIN CALCIUM PO   Take 10 mg by mouth every other day At   Bedtime                                BACLOFEN PO   Take 40 mg by mouth 3 times daily                                HYDROCODONE-ACETAMINOPHEN PO   Take by mouth as needed                                ibuprofen 200 MG capsule   Take 200 mg by mouth 3 times daily                                Leg  Misc   1 Units continuous prn                                lidocaine 4 % Crea cream   Commonly known as:  LMX4   Apply topically once as needed for mild pain                                lisinopril-hydrochlorothiazide 20-25 MG per tablet   Commonly known as:  PRINZIDE/ZESTORETIC   Take 1 tablet by mouth every morning                                loratadine 10 MG tablet   Commonly known as:  CLARITIN   Take 10 mg by mouth every morning                                MIRALAX PO   Take by mouth every morning                                MULTIVITAMIN PO   Take 1 tablet by mouth every morning                                NASAL DECONGESTANT PO   Take by mouth as needed                                phenytoin 30 MG CR capsule   Commonly known as:  DILANTIN   Take 2 capsules (60 mg) by mouth 3 times daily                                POTASSIUM CITRATE PO   Take by mouth daily (with dinner)                                VITAMIN D3 PO   Take 2,000 Units by mouth every morning

## 2018-03-15 NOTE — IR NOTE
Patient Name: Colby Minor  Medical Record Number: [id number]  Today's Date: 3/15/2018    Procedure: radiofrequency percutaneous right trigeminal rhizotomy  Proceduralist: Dr. Richard Conrad MD; Dr. Len Pradhan MD       Sedation administered/monitoring completed by anesthesia staff:  Yamilet Erazo CNRA; Filiberto Rogers MD    Procedure start time: 0818  Puncture time: 0821  Procedure end time: 0856    Other Notes: Pt arrived to IR room 3 from PACU. Pt denies any questions or concerns regarding procedure. Pt positioned supine and monitored per protocol. Target area identified. Rhizotomy completed. Pt tolerated procedure without any noted complications. Pt transferred back to PACU.

## 2018-03-15 NOTE — IP AVS SNAPSHOT
Same Day Surgery [address] [address]    Phone:  [phone]                                       After Visit Summary   3/15/2018    Colby Minor    MRN: [id number]           After Visit Summary Signature Page     I have received my discharge instructions, and my questions have been answered. I have discussed any challenges I see with this plan with the nurse or doctor.    ..........................................................................................................................................  Patient/Patient Representative Signature      ..........................................................................................................................................  Patient Representative Print Name and Relationship to Patient    ..................................................               ................................................  Date                                            Time    ..........................................................................................................................................  Reviewed by Signature/Title    ...................................................              ..............................................  Date                                                            Time

## 2018-03-19 ENCOUNTER — CARE COORDINATION (OUTPATIENT)
Dept: NEUROSURGERY | Facility: CLINIC | Age: 68
End: 2018-03-19

## 2018-03-19 DIAGNOSIS — Z98.890 S/P CRANIOTOMY: ICD-10-CM

## 2018-03-19 DIAGNOSIS — G50.0 TRIGEMINAL NEURALGIA: ICD-10-CM

## 2018-03-19 NOTE — PROGRESS NOTES
Neurosurgery Discharge Coordination Note     Attending physician: Dr. Conrad  Surgery performed:  Radiofrequency Percutaneous Right Trigeminal Rhizotomy  Date of Discharge:  3/15/18  Discharge to: Home     Current status: Patient states feeling good with no facial discomfort, continues medication and will F/U with DAMION Mcrae in 2 weeks.  Requested and filled Dilanting 30mg 2 tablets TID per discharge instructions, at [facility] in [facility].  Denies redness, swelling, increased tenderness, drainage, incision opening or elevated temp. Reports Incision CDI without signs of infection.  Denies current bowel or bladder issues.    Discharge instructions and medications reviewed with patient.  Follow up appointments/imaging/tests needed: 2 week post op with DAMION Mcrae. Will message  to f/u with pt.     RN triage/on call number given: [phone]/ [phone]

## 2018-03-29 ENCOUNTER — OFFICE VISIT (OUTPATIENT)
Dept: NEUROSURGERY | Facility: CLINIC | Age: 68
End: 2018-03-29
Payer: COMMERCIAL

## 2018-03-29 VITALS — HEART RATE: 90 BPM | DIASTOLIC BLOOD PRESSURE: 59 MMHG | SYSTOLIC BLOOD PRESSURE: 130 MMHG

## 2018-03-29 DIAGNOSIS — Z09 SURGERY FOLLOW-UP: ICD-10-CM

## 2018-03-29 DIAGNOSIS — G50.0 TRIGEMINAL NEURALGIA: Primary | ICD-10-CM

## 2018-03-29 NOTE — PROGRESS NOTES
NEUROSURGERY PROGRESS NOTE      REASON FOR VISIT: 2 weeks postop followup    Date: 03/15/2018   NAME OF PROCEDURE:  Right radiofrequency trigeminal rhizotomy.   PREOPERATIVE DIAGNOSIS:  V2/V3 trigeminal neuralgia.   SURGEON: Richard Conrad MD      HISTORY OF PRESENT ILLNESS:  Mr. Minor is a 67-year-old male with past medical history significant for myelitis, spinal cord injury with resulting left-sided weakness, hypertension, hyperlipidemia, chronic left shoulder pain, venous insufficiency in left lower leg, gout, seasonal allergy, and right-sided trigeminal neuralgia. He previously underwent a right radiofrequency rhizotomy on 07/21/2015, as well as a right microvascular decompression on 11/03/2016.   Unfortunately, he developed recurrence in early March 2018, which was refractory to medical management.  For this reason, he electively underwent a repeat right radiofrequency rhizotomy.  Since then, he presented to our office for the first postoperative follow-up.    The patient happily reports today that she has had no recurrent trigeminal neuralgia pain since the procedure.  He endorses numbness in the right V1 and particularly in the V2 and V3 distributions including the right lateral tongue.  He denies problem with opening and closure of his right jaw and/or altered sensation or vision in the right eye.  He is anxious to start weaning off the medication in hopes to improve his cognitive function and mobility to his baseline.  He was taking Dilantin 60 mg daily before the surgery.      INTERVAL HEALTH HISTORY:  Past Medical History:   Diagnosis Date     Allergic rhinitis      CKD (chronic kidney disease)      Gout      Hyperlipidemia      Hypertension      Left hemiplegia (H)      Nephrolithiasis      Transverse myelitis (H) 2005     Trigeminal neuralgia      Venous (peripheral) insufficiency     LLE       CURRENT MEDICATIONS:  Current Outpatient Prescriptions   Medication Sig Dispense Refill     phenytoin  (DILANTIN) 30 MG CR capsule Take 2 capsules (60 mg) by mouth 3 times daily 180 capsule 0     HYDROCODONE-ACETAMINOPHEN PO Take by mouth as needed       Polyethylene Glycol 3350 (MIRALAX PO) Take by mouth every morning       Pseudoephedrine HCl (NASAL DECONGESTANT PO) Take by mouth as needed       aspirin 325 MG tablet Take 1 tablet (325 mg) by mouth every morning 120 tablet 0     ibuprofen 200 MG capsule Take 200 mg by mouth 3 times daily 120 capsule 0     Incontinence Supplies (LEG ) MISC 1 Units continuous prn 1 each 0     AMLODIPINE BESYLATE PO Take by mouth every evening        POTASSIUM CITRATE PO Take by mouth daily (with dinner)        lidocaine (LMX4) 4 % CREA 4% topical cream Apply topically once as needed for mild pain       lisinopril-hydrochlorothiazide (PRINZIDE,ZESTORETIC) 20-25 MG per tablet Take 1 tablet by mouth every morning        loratadine (CLARITIN) 10 MG tablet Take 10 mg by mouth every morning        Multiple Vitamins-Minerals (MULTIVITAMIN PO) Take 1 tablet by mouth every morning        ALLOPURINOL PO Take 300 mg by mouth every morning        ATORVASTATIN CALCIUM PO Take 10 mg by mouth every other day At  Bedtime       Cholecalciferol (VITAMIN D3 PO) Take 2,000 Units by mouth every morning        BACLOFEN PO Take 40 mg by mouth 3 times daily          ALLERGIES:  Review of patient's allergies indicates no active allergies.      PHYSICAL EXAMINATION:  Filed Vitals:  /59  Pulse 90   Patient Supplied Answers To the  Pain Questionnaire  [facility] Pain -  Patient Entered Questionnaire/Answers 3/29/2018   What number best describes your pain right now:  0 = No pain  to  10 = Worst pain imaginable 0   Which of the following worsen your pain? nothing worsens the pain   What number best describes your average pain for the past week:  0 = No pain  to  10 = Worst pain imaginable 0   What number best describes your LOWEST pain in past 24 hours:  0 = No pain  to  10 = Worst pain imaginable 0    What number best describes your WORST pain in past 24 hours:  0 = No pain  to  10 = Worst pain imaginable 0   What non-medicine treatments have you already had for your pain? surgery   Have you tried treating your pain with medication?  Yes   Are you currently taking medications for your pain? Yes   Appearance: Comfortable and relaxed  Focus Neurologic Exam:  Peripheral visual fields is intact. Extraocular movements conjugate and full, no ptosis.Corneal reflex is intact. Facial sensation is diminished in the right half of his face, particularly in the V2/V3 distributions.. Masseter and pterygoid muscle strength is normal. Hearing is to his baseline bilaterally.      ASSESSMENT: Right-sided V2/V3 trigeminal neuralgia in remission after the repeat radiofrequency rhizotomy.      RECOMMENDATIONS: I am pleased to hear that Mr. Redmond is doing very well.  I think it is reasonable to start tapering him off the medication.  I have given him a written schedule to taper the Dilantin by 30 mg every 3 days over the next 18 days.  I have also asked him to give us a progress report in about 3 weeks or sooner for concerns or questions..  We are comfortable in releasing him to PRN followup after today's visit.  We will of course be happy to see him should he develop recurrence and would like our assistance in managing this in the future.  Contact number was given to the patient at the end of the visit.    Thank you very much for allowing us to participate in the care of this patient. Please do not hesitate to contact us with questions. We will keep you informed of his progress.         Grecia Mcrae, CIELO, APRN, FNP-BC  [facility]   Department of Neurosurgery  Phone: [phone]  Fax: [phone]

## 2018-03-29 NOTE — MR AVS SNAPSHOT
After Visit Summary   3/29/2018    Colby Minor    MRN: [id number]           Patient Information     Date Of Birth          1950        Visit Information        Provider Department      3/29/2018 3:30 PM Grecia Mcrae APRN [facility]        Care Instructions    COURT Colby Redmond s Medication Tapering Schedule:  * Decrease your Dilantin dosage by 30 mg every 3 days  base on the following pattern:  Day 1-3:   60 mg/ 60 mg/ 30 mg  Day 4-7:  60 mg/ 30 mg/ 30 mg  Day 8-11:  30 mg 3 times per day.  Day 12-15:   30 mg 2 timer per day.  Day 16-19:   30 mg daily   Day 20-23:  30 mg every to other day.  You will be all done after day 23.  B. Call [phone] in 3 weeks with your progress. Call earlier for concerns or questions.          Follow-ups after your visit        Who to contact     Please call your clinic at [phone] to:    Ask questions about your health    Make or cancel appointments    Discuss your medicines    Learn about your test results    Speak to your doctor            Additional Information About Your Visit        MyChart Information     AMT is an electronic gateway that provides easy, online access to your medical records. With AMT, you can request a clinic appointment, read your test results, renew a prescription or communicate with your care team.     To sign up for AMT visit the website at [email or web address]   You will be asked to enter the access code listed below, as well as some personal information. Please follow the directions to create your username and password.     Your access code is: [email or web address]  Expires: 2018  7:30 AM     Your access code will  in 90 days. If you need help or a new code, please contact your [facility] or call [phone] for assistance.        Care EveryWhere ID     This is your Care EveryWhere ID. This could be used by other organizations to access your  [facility] medical records  [id number]        Your Vitals Were     Pulse                   90            Blood Pressure from Last 3 Encounters:   03/29/18 130/59   03/15/18 154/81   03/02/18 114/66    Weight from Last 3 Encounters:   03/15/18 101.5 kg (223 lb 12.3 oz)   03/02/18 101.4 kg (223 lb 8 oz)   03/02/18 101.5 kg (223 lb 12.8 oz)              Today, you had the following     No orders found for display       Primary Care Provider Office Phone # Fax #    Colby Lindsey [phone]       [facility] [address] [place] [address]        Equal Access to Services     AIDEN IBRAHIM : Hadii shanon andrewso Soshelly, waaxda luqadaha, qaybta kaalmada adeegyada, luis plunkett. So [facility] [phone].    ATENCIÓN: Si habla español, tiene a mcbride disposición servicios gratuitos de asistencia lingüística. Llame al [phone].    We comply with applicable federal civil rights laws and [place] laws. We do not discriminate on the basis of race, color, national origin, age, disability, sex, sexual orientation, or gender identity.            Thank you!     Thank you for choosing [facility]  for your care. Our goal is always to provide you with excellent care. Hearing back from our patients is one way we can continue to improve our services. Please take a few minutes to complete the written survey that you may receive in the mail after your visit with us. Thank you!             Your Updated Medication List - Protect others around you: Learn how to safely use, store and throw away your medicines at www.disposemymeds.org.          This list is accurate as of 3/29/18  3:47 PM.  Always use your most recent med list.                   Brand Name Dispense Instructions for use Diagnosis    ALLOPURINOL PO      Take 300 mg by mouth every morning        AMLODIPINE BESYLATE PO      Take by mouth every evening        aspirin 325 MG tablet     120 tablet    Take 1 tablet (325  mg) by mouth every morning        ATORVASTATIN CALCIUM PO      Take 10 mg by mouth every other day At  Bedtime        BACLOFEN PO      Take 40 mg by mouth 3 times daily        HYDROCODONE-ACETAMINOPHEN PO      Take by mouth as needed        ibuprofen 200 MG capsule     120 capsule    Take 200 mg by mouth 3 times daily        Leg  Misc     1 each    1 Units continuous prn    Trigeminal neuralgia       lidocaine 4 % Crea cream    LMX4     Apply topically once as needed for mild pain        lisinopril-hydrochlorothiazide 20-25 MG per tablet    PRINZIDE/ZESTORETIC     Take 1 tablet by mouth every morning        loratadine 10 MG tablet    CLARITIN     Take 10 mg by mouth every morning        MIRALAX PO      Take by mouth every morning        MULTIVITAMIN PO      Take 1 tablet by mouth every morning        NASAL DECONGESTANT PO      Take by mouth as needed        phenytoin 30 MG CR capsule    DILANTIN    180 capsule    Take 2 capsules (60 mg) by mouth 3 times daily    Trigeminal neuralgia, S/P craniotomy       POTASSIUM CITRATE PO      Take by mouth daily (with dinner)        VITAMIN D3 PO      Take 2,000 Units by mouth every morning

## 2018-03-29 NOTE — LETTER
3/29/2018       RE: Colby Minor  [address] [place] [id number]     Dear Colleague,    Thank you for referring your patient, Colby Minor, to the [facility] NEUROSURGERY at [facility]. Please see a copy of my visit note below.    NEUROSURGERY PROGRESS NOTE      REASON FOR VISIT: 2 weeks postop followup    Date: 03/15/2018   NAME OF PROCEDURE:  Right radiofrequency trigeminal rhizotomy.   PREOPERATIVE DIAGNOSIS:  V2/V3 trigeminal neuralgia.   SURGEON: Richard Conrad MD      HISTORY OF PRESENT ILLNESS:  Mr. Minor is a 67-year-old male with past medical history significant for myelitis, spinal cord injury with resulting left-sided weakness, hypertension, hyperlipidemia, chronic left shoulder pain, venous insufficiency in left lower leg, gout, seasonal allergy, and right-sided trigeminal neuralgia. He previously underwent a right radiofrequency rhizotomy on 07/21/2015, as well as a right microvascular decompression on 11/03/2016.    Unfortunately, he developed recurrence in early March 2018, which was refractory to medical management.  For this reason, he electively underwent a repeat right radiofrequency rhizotomy.   Since then, he presented to our office for the first postoperative follow-up.    The patient happily reports today that she has had no recurrent trigeminal neuralgia pain since the procedure.  He endorses numbness in the right V1 and particularly in the V2 and V3 distributions including the right lateral tongue.  He denies problem with opening and closure of his right jaw and/or altered sensation or vision in the right eye.  He is anxious to start weaning off the medication in hopes to improve his cognitive function and mobility to his baseline.  He was taking Dilantin 60 mg daily before the surgery.      INTERVAL HEALTH HISTORY:  Past Medical History:   Diagnosis Date     Allergic rhinitis      CKD (chronic kidney disease)      Gout       Hyperlipidemia      Hypertension      Left hemiplegia (H)      Nephrolithiasis      Transverse myelitis (H) 2005     Trigeminal neuralgia      Venous (peripheral) insufficiency     LLE       CURRENT MEDICATIONS:  Current Outpatient Prescriptions   Medication Sig Dispense Refill     phenytoin (DILANTIN) 30 MG CR capsule Take 2 capsules (60 mg) by mouth 3 times daily 180 capsule 0     HYDROCODONE-ACETAMINOPHEN PO Take by mouth as needed       Polyethylene Glycol 3350 (MIRALAX PO) Take by mouth every morning       Pseudoephedrine HCl (NASAL DECONGESTANT PO) Take by mouth as needed       aspirin 325 MG tablet Take 1 tablet (325 mg) by mouth every morning 120 tablet 0     ibuprofen 200 MG capsule Take 200 mg by mouth 3 times daily 120 capsule 0     Incontinence Supplies (LEG ) MISC 1 Units continuous prn 1 each 0     AMLODIPINE BESYLATE PO Take by mouth every evening        POTASSIUM CITRATE PO Take by mouth daily (with dinner)        lidocaine (LMX4) 4 % CREA 4% topical cream Apply topically once as needed for mild pain       lisinopril-hydrochlorothiazide (PRINZIDE,ZESTORETIC) 20-25 MG per tablet Take 1 tablet by mouth every morning        loratadine (CLARITIN) 10 MG tablet Take 10 mg by mouth every morning        Multiple Vitamins-Minerals (MULTIVITAMIN PO) Take 1 tablet by mouth every morning        ALLOPURINOL PO Take 300 mg by mouth every morning        ATORVASTATIN CALCIUM PO Take 10 mg by mouth every other day At  Bedtime       Cholecalciferol (VITAMIN D3 PO) Take 2,000 Units by mouth every morning        BACLOFEN PO Take 40 mg by mouth 3 times daily          ALLERGIES:  Review of patient's allergies indicates no active allergies.      PHYSICAL EXAMINATION:  Filed Vitals:  /59  Pulse 90   Patient Supplied Answers To the  Pain Questionnaire  [facility] Pain -  Patient Entered Questionnaire/Answers 3/29/2018   What number best describes your pain right now:  0 = No pain  to  10 = Worst pain imaginable 0    Which of the following worsen your pain? nothing worsens the pain   What number best describes your average pain for the past week:  0 = No pain  to  10 = Worst pain imaginable 0   What number best describes your LOWEST pain in past 24 hours:  0 = No pain  to  10 = Worst pain imaginable 0   What number best describes your WORST pain in past 24 hours:  0 = No pain  to  10 = Worst pain imaginable 0   What non-medicine treatments have you already had for your pain? surgery   Have you tried treating your pain with medication?  Yes   Are you currently taking medications for your pain? Yes   Appearance: Comfortable and relaxed  Focus Neurologic Exam:  Peripheral visual fields is intact. Extraocular movements conjugate and full, no ptosis.Corneal reflex is intact. Facial sensation is diminished in the right half of his face, particularly in the V2/V3 distributions.. Masseter and pterygoid muscle strength is normal. Hearing is to his baseline bilaterally.      ASSESSMENT: Right-sided V2/V3 trigeminal neuralgia in remission after the repeat radiofrequency rhizotomy.      RECOMMENDATIONS: I am pleased to hear that Mr. Redmond is doing very well.  I think it is reasonable to start tapering him off the medication.  I have given him a written schedule to taper the Dilantin by 30 mg every 3 days over the next 18 days.  I have also asked him to give us a progress report in about 3 weeks or sooner for concerns or questions..  We are comfortable in releasing him to PRN followup after today's visit.  We will of course be happy to see him should he develop recurrence and would like our assistance in managing this in the future.  Contact number was given to the patient at the end of the visit.    Thank you very much for allowing us to participate in the care of this patient. Please do not hesitate to contact us with questions. We will keep you informed of his progress.       Grecia Mcrae, CIELO, APRN, FNP-[facility]  [facility]   Department of Neurosurgery  Phone: [phone]  Fax: [phone]

## 2018-03-29 NOTE — PATIENT INSTRUCTIONS
COURT Redmond s Medication Tapering Schedule:  * Decrease your Dilantin dosage by 30 mg every 3 days  base on the following pattern:  Day 1-3:   60 mg/ 60 mg/ 30 mg  Day 4-7:  60 mg/ 30 mg/ 30 mg  Day 8-11:  30 mg 3 times per day.  Day 12-15:   30 mg 2 timer per day.  Day 16-19:   30 mg daily   Day 20-23:  30 mg every to other day.  You will be all done after day 23.  B. Call [phone] in 3 weeks with your progress. Call earlier for concerns or questions.

## 2018-03-30 DIAGNOSIS — G50.0 TRIGEMINAL NEURALGIA: ICD-10-CM

## 2018-03-30 DIAGNOSIS — Z98.890 S/P CRANIOTOMY: ICD-10-CM

## 2018-04-02 NOTE — TELEPHONE ENCOUNTER
Patient calling asking for refill of Dilatin 30mg tablets now taking 2 tablets TID.  Pt states will try and taper off per NP Ritter schedule, but wanting a full month of medication in case it is needed.    OV with NP Ritter on 3/29/18.    Voices understanding to taper per instructions.  Medication refilled at [facility] per patient request.

## 2018-04-19 ENCOUNTER — TELEPHONE (OUTPATIENT)
Dept: NEUROSURGERY | Facility: CLINIC | Age: 68
End: 2018-04-19

## 2018-04-19 NOTE — TELEPHONE ENCOUNTER
"LOV 3/29/18   DOS 3/15/18 [place] Radiofrequency Right Rhizotomy    Spoke with patient, Patient is tapered off Dilantin totally and doing well.  States no facial pain or sensitivity at this time.  States \"I am doing good.\"     I requested he callback for any reoccuring facial issues.  Pt has my number .    Voices understanding.  "

## (undated) RX ORDER — ESMOLOL HYDROCHLORIDE 10 MG/ML
INJECTION INTRAVENOUS
Status: DISPENSED
Start: 2018-03-15

## (undated) RX ORDER — CEFAZOLIN SODIUM 2 G/100ML
INJECTION, SOLUTION INTRAVENOUS
Status: DISPENSED
Start: 2018-03-15

## (undated) RX ORDER — FENTANYL CITRATE 50 UG/ML
INJECTION, SOLUTION INTRAMUSCULAR; INTRAVENOUS
Status: DISPENSED
Start: 2018-03-15